# Patient Record
Sex: MALE | Race: WHITE | NOT HISPANIC OR LATINO | URBAN - METROPOLITAN AREA
[De-identification: names, ages, dates, MRNs, and addresses within clinical notes are randomized per-mention and may not be internally consistent; named-entity substitution may affect disease eponyms.]

---

## 2017-01-09 ENCOUNTER — EMERGENCY (EMERGENCY)
Facility: HOSPITAL | Age: 37
LOS: 1 days | Discharge: HOME | End: 2017-01-09
Attending: EMERGENCY MEDICINE | Admitting: EMERGENCY MEDICINE
Payer: SELF-PAY

## 2017-01-09 VITALS
DIASTOLIC BLOOD PRESSURE: 50 MMHG | OXYGEN SATURATION: 98 % | HEART RATE: 72 BPM | RESPIRATION RATE: 20 BRPM | TEMPERATURE: 99 F | SYSTOLIC BLOOD PRESSURE: 102 MMHG

## 2017-01-09 PROCEDURE — 99283 EMERGENCY DEPT VISIT LOW MDM: CPT

## 2017-01-09 RX ORDER — DIPHENHYDRAMINE HCL 50 MG
25 CAPSULE ORAL ONCE
Qty: 0 | Refills: 0 | Status: COMPLETED | OUTPATIENT
Start: 2017-01-09 | End: 2017-01-09

## 2017-01-09 RX ORDER — ONDANSETRON 8 MG/1
4 TABLET, FILM COATED ORAL ONCE
Qty: 0 | Refills: 0 | Status: COMPLETED | OUTPATIENT
Start: 2017-01-09 | End: 2017-01-09

## 2017-01-09 RX ADMIN — Medication 25 MILLIGRAM(S): at 22:52

## 2017-01-09 RX ADMIN — ONDANSETRON 4 MILLIGRAM(S): 8 TABLET, FILM COATED ORAL at 22:52

## 2017-01-09 NOTE — ED ADULT TRIAGE NOTE - CHIEF COMPLAINT QUOTE
pt biba, states he is shaky and he thinks he is withdrawing from heroin. started using 2 weeks ago and has not used for the last 2 days. also states he has bed bugs and bites.

## 2017-01-09 NOTE — ED PROVIDER NOTE - MEDICAL DECISION MAKING DETAILS
Pt given zofran 4mg PO for nausea and benadryl 25mg PO for pruritic insect bites. Education on bed bug erradication. A&Ox3. NAD. Afebrile. nontoxic. No other acute medical complaints at this time.

## 2017-01-13 DIAGNOSIS — S40.861A INSECT BITE (NONVENOMOUS) OF RIGHT UPPER ARM, INITIAL ENCOUNTER: ICD-10-CM

## 2017-01-13 DIAGNOSIS — F11.10 OPIOID ABUSE, UNCOMPLICATED: ICD-10-CM

## 2017-01-13 DIAGNOSIS — S40.862A INSECT BITE (NONVENOMOUS) OF LEFT UPPER ARM, INITIAL ENCOUNTER: ICD-10-CM

## 2017-01-13 DIAGNOSIS — F17.210 NICOTINE DEPENDENCE, CIGARETTES, UNCOMPLICATED: ICD-10-CM

## 2017-03-24 ENCOUNTER — EMERGENCY (EMERGENCY)
Facility: HOSPITAL | Age: 37
LOS: 1 days | Discharge: LEFT BEFORE DISCHARGE | End: 2017-03-24
Attending: EMERGENCY MEDICINE | Admitting: EMERGENCY MEDICINE
Payer: MEDICAID

## 2017-03-24 VITALS
OXYGEN SATURATION: 100 % | HEART RATE: 80 BPM | RESPIRATION RATE: 18 BRPM | WEIGHT: 169.98 LBS | SYSTOLIC BLOOD PRESSURE: 105 MMHG | HEIGHT: 70 IN | TEMPERATURE: 98 F | DIASTOLIC BLOOD PRESSURE: 72 MMHG

## 2017-03-24 DIAGNOSIS — M54.5 LOW BACK PAIN: ICD-10-CM

## 2017-03-24 DIAGNOSIS — M25.552 PAIN IN LEFT HIP: ICD-10-CM

## 2017-03-24 PROCEDURE — 99282 EMERGENCY DEPT VISIT SF MDM: CPT

## 2017-03-24 RX ORDER — HALOPERIDOL DECANOATE 100 MG/ML
5 INJECTION INTRAMUSCULAR ONCE
Qty: 0 | Refills: 0 | Status: DISCONTINUED | OUTPATIENT
Start: 2017-03-24 | End: 2017-03-28

## 2017-03-24 RX ORDER — BENZTROPINE MESYLATE 1 MG
1 TABLET ORAL ONCE
Qty: 0 | Refills: 0 | Status: DISCONTINUED | OUTPATIENT
Start: 2017-03-24 | End: 2017-03-28

## 2017-03-24 RX ORDER — DIVALPROEX SODIUM 500 MG/1
500 TABLET, DELAYED RELEASE ORAL ONCE
Qty: 0 | Refills: 0 | Status: DISCONTINUED | OUTPATIENT
Start: 2017-03-24 | End: 2017-03-28

## 2017-03-24 RX ORDER — IBUPROFEN 200 MG
600 TABLET ORAL ONCE
Qty: 0 | Refills: 0 | Status: DISCONTINUED | OUTPATIENT
Start: 2017-03-24 | End: 2017-03-28

## 2017-03-24 NOTE — ED PROVIDER NOTE - CONSTITUTIONAL, MLM
normal... unkmept, Well appearing, well nourished, awake, alert, oriented to person, place, time/situation and in no apparent distress.

## 2017-03-24 NOTE — ED PROVIDER NOTE - MEDICAL DECISION MAKING DETAILS
35 y/o M, apparently undomicilied presents to ED c/o L hip and lumbar back pain [place]  Pt well appearing.  No ecchymosis.  Pt also requesting dose of his psych medications recently prescribed.  Xray of hip and lumbar back ordered.  Pt eloped without receiving medications and before xray.  He was fed and tolerated PO.

## 2017-03-24 NOTE — ED PROVIDER NOTE - OBJECTIVE STATEMENT
35 y/o M presents to ED c/o lumbar back pain and L hip pain s/p [place]  Pt states the injury occurred a few hours ago and denies hitting his head.  He is not sure why he fell and states he may have been intoxicated at the time.  He also states he was recently discharged from outside hospital for psych and was discharged with prescriptions but has not been able fill them as of yet and is requesting a dose of each.  He has the paper prescriptions with him.  He denies any other injuries.

## 2017-03-25 PROBLEM — F19.90 OTHER PSYCHOACTIVE SUBSTANCE USE, UNSPECIFIED, UNCOMPLICATED: Chronic | Status: ACTIVE | Noted: 2017-01-09

## 2017-03-28 ENCOUNTER — EMERGENCY (EMERGENCY)
Facility: HOSPITAL | Age: 37
LOS: 1 days | Discharge: HOME | End: 2017-03-28
Attending: EMERGENCY MEDICINE | Admitting: EMERGENCY MEDICINE
Payer: MEDICAID

## 2017-03-28 VITALS
OXYGEN SATURATION: 100 % | TEMPERATURE: 98 F | SYSTOLIC BLOOD PRESSURE: 134 MMHG | DIASTOLIC BLOOD PRESSURE: 62 MMHG | HEART RATE: 76 BPM | RESPIRATION RATE: 20 BRPM

## 2017-03-28 DIAGNOSIS — F20.9 SCHIZOPHRENIA, UNSPECIFIED: ICD-10-CM

## 2017-03-28 DIAGNOSIS — F17.200 NICOTINE DEPENDENCE, UNSPECIFIED, UNCOMPLICATED: ICD-10-CM

## 2017-03-28 PROCEDURE — 99282 EMERGENCY DEPT VISIT SF MDM: CPT

## 2017-03-28 NOTE — ED PROVIDER NOTE - MEDICAL DECISION MAKING DETAILS
Patient presenting with hunger and not liking his shelter. he was just released from psych at  and also wants his other meds- thinks it might be Zoloft. Also wants to be back inpt because he likes it better than shelter. No acute psych sx or indication for psych eval. Advised to return to  to determine where his Rx's were sent and will give food. was tx's Haldol Dec when inpt so he is not overdue for his antipsychotic. Dn take cogentin.

## 2017-03-28 NOTE — ED PROVIDER NOTE - NS ED MD SCRIBE ATTENDING SCRIBE SECTIONS
HIV/DISPOSITION/RESULTS/PAST MEDICAL/SURGICAL/SOCIAL HISTORY/REVIEW OF SYSTEMS/HISTORY OF PRESENT ILLNESS/VITAL SIGNS( Pullset)/PHYSICAL EXAM/PROGRESS NOTE

## 2017-03-28 NOTE — ED ADULT TRIAGE NOTE - CHIEF COMPLAINT QUOTE
Pt states, "I don't like my shelter, please send me by ambulance to [facility] I like it better there" seen in this ED 4 days ago for nonmedical complaint. Asked pt if he would like shelter referral list or to speak with SW, pt states that during last visit he was told by another patient that if he complained of SI he would be given a bed. States, "So I will kill myself"

## 2017-03-28 NOTE — ED PROVIDER NOTE - OBJECTIVE STATEMENT
36 y.o male with a history of Schizophrenia presents to the ED for multiple reasons: not satisfied with his shelter and wants to be readmitted to the Psych department at . Pt was discharged from [facility] 4 days ago with medication but doesn't remember what medications, thinks it may be Zoloft. States he takes Haldol and received a Cogentin shot while he was at [facility]. States he is afraid of people "killing" him but denies any SI/HI at the moment. Admits to K2 use, last use was this morning. Requesting for food. Denies auditory hallucinations, visual hallucinations, and any psychotic episodes.

## 2017-10-19 NOTE — ED PROVIDER NOTE - OBJECTIVE STATEMENT
Kelly Duke 
 
 
 [address] [facility] 
[phone] Patient: Loretta Valdivia MRN: [id number] Sisto Genre You are allergic to the following No active allergies Recent Documentation Height Weight BMI Smoking Status 1.905 m 93.9 kg 25.87 kg/m2 Former Smoker Emergency Contacts Name Discharge Info Relation Home Work Mobile Sugar Veliz DISCHARGE CAREGIVER [3] Spouse [3] [phone] About your hospitalization You were admitted on:  October 19, 2017 You last received care in the:  [facility] PACU You were discharged on:  October 19, 2017 Unit phone number:  [phone] Why you were hospitalized Your primary diagnosis was:  Chronic Sinusitis Providers Seen During Your Hospitalizations Provider Role Specialty Primary office phone Radha Browning MD Attending Provider Otolaryngology [phone] Your Primary Care Physician (PCP) Primary Care Physician Office Phone Office Fax Lisa Niels [phone] Follow-up Information Follow up With Details Comments Contact Info Radha Browning MD On 10/23/2017 4:30 pm [address] [address] [address] [facility] 
[phone] Toby Thompson MD   [facility] [address] [facility] 
[phone] Current Discharge Medication List  
  
START taking these medications Dose & Instructions Dispensing Information Comments Morning Noon Evening Bedtime  
 cefUROXime 250 mg tablet Commonly known as:  CEFTIN Your last dose was: Your next dose is:    
   
   
 Dose:  250 mg Take 1 Tab by mouth two (2) times a day for 5 days. Quantity:  10 Tab Refills:  0 HYDROcodone-acetaminophen 5-325 mg per tablet Commonly known as:  Ksenia Mura Your last dose was: Your next dose is: Dose:  1-2 Tab Take 1-2 Tabs by mouth every four (4) hours as needed for Pain. Max Daily Amount: 12 Tabs. Quantity:  20 Tab Refills:  0 CONTINUE these medications which have NOT CHANGED Dose & Instructions Dispensing Information Comments Morning Noon Evening Bedtime  
 multivitamin tablet Commonly known as:  ONE A DAY Your last dose was: Your next dose is:    
   
   
 Dose:  1 Tab Take 1 Tab by mouth daily. Refills:  0 OMEGA 3 FISH OIL PO Your last dose was: Your next dose is:    
   
   
 Dose:  1 Tab Take 1 Tab by mouth. Refills:  0 XYZAL 5 mg tablet Generic drug:  levocetirizine Your last dose was: Your next dose is:    
   
   
 Dose:  5 mg Take 5 mg by mouth daily. Refills:  0 Where to Get Your Medications Information on where to get these meds will be given to you by the nurse or doctor. ! Ask your nurse or doctor about these medications  
  cefUROXime 250 mg tablet HYDROcodone-acetaminophen 5-325 mg per tablet Discharge Instructions DISCHARGE SUMMARY from your Nurse PATIENT INSTRUCTIONS After general anesthesia or intravenous sedation, for 24 hours or while taking prescription Narcotics: · Limit your activities · Do not drive and operate hazardous machinery · Do not make important personal or business decisions · Do  not drink alcoholic beverages · If you have not urinated within 8 hours after discharge, please contact your surgeon on call. Report the following to your surgeon: 
· Excessive pain, swelling, redness or odor of or around the surgical area · Temperature over 100.5 · Nausea and vomiting lasting longer than 4 hours or if unable to take medications · Any signs of decreased circulation or nerve impairment to extremity: change in color, persistent  numbness, tingling, coldness or increase pain · Any questions [address] Breathing deeply and coughing are very important exercises to do after surgery. Deep breathing and coughing open the little air tubes and air sacks in your lungs. You take deep breaths every day. You may not even notice - it is just something you do when you sigh or yawn. It is a natural exercise you do to keep these air passages open. After surgery, take deep breaths and cough, on purpose. DIRECTIONS: 
· Take 10 to 15 slow deep breaths every hour while awake. · Breathe in deeply, and hold it for 2 seconds. · Exhale slowly through puckered lips, like blowing up a balloon. · After every 4th or 5th deep breath, hug your pillow to your chest or belly and give a hard, deep cough. Yes, it will probably hurt. But doing this exercise is a very important part of healing after surgery. Take your pain medicine to help you do this exercise without too much pain. Coughing and deep breathing help prevent bronchitis and pneumonia after surgery. If you had chest or belly surgery, use a pillow as a \"hug balbir\" and hold it tightly to your chest or belly when you cough. ANKLE PUMPS Ankle pumps increase the circulation of oxygenated blood to your lower extremities and decrease your risk for circulation problems such as blood clots. They also stretch the muscles, tendons and ligaments in your foot and ankle, and prevent joint contracture in the ankle and foot, especially after surgeries on the legs. It is important to do ankle pump exercises regularly after surgery because immobility increases your risk for developing a blood clot. Your doctor may also have you take an Aspirin for the next few days as well. If your doctor did not ask you to take an Aspirin, consult with him before starting Aspirin therapy on your own. The exercise is quite simple. · Slowly point your foot forward, feeling the muscles on the top of your lower leg stretch, and hold this position for 5 seconds. · Next, pull your foot back toward you as far as possible, stretching the calf muscles, and hold that position for 5 seconds. · Repeat with the other foot. · Perform 10 repetitions every hour while awake for both ankles if possible (down and then up with the foot once is one repetition). You should feel gentle stretching of the muscles in your lower leg when doing this exercise. If you feel pain, or your range of motion is limited, don't push too hard. Only go the limit your joint and muscles will let you go. If you have increasing pain, progressively worsening leg warmth or swelling, STOP the exercise and call your doctor. MEDICATION AND  
SIDE EFFECT GUIDE The [address] [place] [address] EFFECT GUIDE was provided to the PATIENT AND CARE PROVIDER. Information provided includes instruction about drug purpose and common side effects for the following medications:  
·  [address] These are general instructions for a healthy lifestyle: *   Please give a list of your current medications to your Primary Care Provider. *   Please update this list whenever your medications are discontinued, doses are changed, or new medications (including over-the-counter products) are added. *   Please carry medication information at all times in case of emergency situations. About Smoking No smoking / No tobacco products Avoid exposure to second hand smoke Surgeon General's Warning:  Quitting smoking now greatly reduces serious risk to your health. Obesity, smoking, and sedentary lifestyle greatly increases your risk for illness and disease. A healthy diet, regular physical exercise & weight monitoring are important for maintaining a healthy lifestyle. Congestive Heart Failure You may be retaining fluid if you have a history of heart failure or if you experience any of the following symptoms:  Weight gain of 3 pounds or more overnight or 5 pounds in a week, increased swelling in your hands or feet or shortness of breath while lying flat in bed. Please call your doctor as soon as you notice any of these symptoms; do not wait until your next office visit. Recognize signs and symptoms of STROKE: 
F -  Face looks uneven A -  Arms unable to move or move evenly S -  Speech slurred or non-existent T -  Time-call 911 as soon as signs and symptoms begin-DO NOT go  
       back to bed or wait to see if you get better-TIME IS BRAIN. Warning Signs of HEART ATTACK Call 911 if you have these symptoms: 
 
? Chest discomfort. Most heart attacks involve discomfort in the center of the chest that lasts more than a few minutes, or that goes away and comes back. It can feel like uncomfortable pressure, squeezing, fullness, or pain. ? Discomfort in other areas of the upper body. Symptoms can include pain or discomfort in one or both arms, the back, neck, jaw, or stomach. ? Shortness of breath with or without chest discomfort. ? Other signs may include breaking out in a cold sweat, nausea, or lightheadedness. Don't wait more than five minutes to call [address]! Fast action can save your life. Calling 911 is almost always the fastest way to get lifesaving treatment. Emergency Medical Services staff can begin treatment when they arrive  up to an hour sooner than if someone gets to the hospital by car. The discharge information has been reviewed with the patient and spouse. Any questions and concerns from the patient and spouse have been addressed. The patient and spouse verbalized understanding. Other information in your discharge envelope: PRESCRIPTIONS      PHYSICAL THERAPY PRESCRIPTION 
     APPOINTMENT CARDS 
 [facility] Pamphlet for block or block with On-Q Catheter from   Anesthesia Service Medical device information sheets/pamphlets from their  School/work excuse note. /parent work excuse note. The following personal items collected during your admission are returned to you:  
Dental Appliance: Dental Appliances: None Vision: Visual Aid: Glasses Hearing Aid:   
Jewelry: Jewelry: None Clothing: Clothing: Pants, Footwear, Undergarments, Shirt Other Valuables: Other Valuables: Irvin Ludwig Valuables sent to safe: Personal Items Sent to Safe: n/aPatient Discharge Instructions Martin Jerez / [id number] : 1964 Admitted 10/19/2017 Discharged: 10/19/2017 Postoperative Instructions for Sinus Surgery The purpose of Functional Endoscopic Sinus Surgery (FESS) is to enlarge the natural sinus drainage pathways to prevent obstruction and infection. The surgery is performed through the nostrils using endoscopes and special instruments. Removal of bone and tissue blocking the sinus drainage pathways leaves, by nature, raw surfaces in the nose that must heal by lining tissue, or mucosa, growing back to re-line the newly created space. The #1 impediment to healing (other than smoking) is dryness. When blood clots and mucous dry on the surface of the operative sites, hard crusts can form that prevent the mucosa from healing over the raw areas. Crusts also act as scaffolding for scar bands to grow across opened drainage pathways that may affect the success of the surgery. These scar bands sometimes require further surgery to correct. There are a few things that can be done to prevent crusting. At the time of surgery, new viscous gel type material can be placed over the raw surfaces to keep the areas moist for a few days. This material dissolves slowly over the first few days after surgery.   After surgery, saline irrigation of the nose is extremely important to prevent dryness. Your participation in the postoperative care can sometimes make or break the success of the surgery. SWELLING Every operation, no matter how minor, is accompanied by swelling of the surrounding tissues. The degree of swelling varies from person to person, and with the amount of surgery required. There is usually not very much, if any, external swelling after endoscopic sinus surgery. However, if other procedures, such as septoplasty or rhinoplasty were performed along with the sinus suregery, swelling may be a bigger factor. Staying upright as much as possible is important after you leave the hospital.  Avoid bending over or lifting heavy objects for at least one week as it may aggravate swelling or cause bleeding. Sleep with the head of the bed elevated for approximately 3-5 days. You may place an additional pillow under your head to accomplish this. Avoid rubbing the nostrils and base of the nose as this may cause infection or bleeding. Use the moustache gauze dressing if discharge is excessive. The moustache gauze dressing may have to be changed as often as every thirty minutes the day of surgery. This is not abnormal and will significantly lessen in the next twelve hour period. You will usually have clear plastic tape on your cheeks with the moustache dressing taped over it. Leave the clear tape in place until you do not need the moustache dressing anymore (usually 24-48 hours). This avoids irritating the skin from frequent moustache dressing changes. NASAL PACKING AND BLEEDING There is usually a small amount of absorbable packing material placed in the sinus regions at the time of the procedure. This packing is typically not noticeable or uncomfortable.   Any residual packing material that remains at the time of the first postoperative visit may be removed in the office. If you blow out some odd-looking material during your recovery period, it may represent some of the packing material.  No need for alarm. In rare cases, if bleeding was severe during the procedure, a more formal nasal pack may need to be placed and this will have to be removed a couple of days after surgery. PAIN Pain is generally not severe after sinus surgery. Some patients will experience sinus pressure and headaches. Occasional nasal 'burning' is reported. If the pain is mild, acetaminophen or ibuprofen (or Aleve) may be all that is needed. A narcotic pain medication prescription is often provided and this can be taken if necessary. Please do not take aspirin or any aspirin containing medication for at least one week after surgery. MEDICATIONS You will usually be prescribed pain medication, antibiotics, ointment, and nasal saline. Nasal saline can be obtained over the counter. Sealed, pressurized saline spray, such as Simply Saline or Nasamist, is preferred (see pictures). Any antibiotic ointment, such as bacitracin or polysporin, can be used. IRRIGATING THE NOSE On the first to second day after the surgery, you should begin using NeilMed Sinusrinse irrigation at least twice a day. This is extremely important, as explained above. It is not necessary to boil the water to be used for irrigation. Distilled water is fine. You may see blood clots, crusts, and even some fresh blood during irrigation. This is normal and should not be cause for alarm. RESUMING ACTIVITIES You should avoid heavy lifting, bending, and straining for the first 2 weeks after surgery. Your exercise regimen must be lessened to some extent for the first few weeks following surgery. Upper body exercise is especially prohibited, as it is more likely to cause bleeding. Walking is always permissible. PLEASE CHECK WITH THE OFFICE BFORE RESUMING ANY OTHER EXERCISE OR ATHLETIC ACTIVITY. SMOKING Smoking is absolutely prohibited after surgery. Smoking seriously impairs wound healing. Nicotine gums or other stop-smoking products containing nicotine should not be used. Nicotine inhibits wound healing by constricting blood vessels. It is essential to quit smoking prior to surgery. YOUR FIRST POSTOPERATIVE OFFICE VISIT The appointment for your first postoperative visit can be made prior to your surgery or you can call to schedule after the procedure. At that visit, the nose will be closely inspected and any residual debris (ie., blood, scabbing/crusting, residual absorbable packing) will likely be removed using a small suction. This is generally fairly easy and you should not be too worried about much pain or recovery following the appointment. RETURN TO WORK OR SCHOOL The average patient is able to return to school or work three to five days following the surgery. Physical activity will be curtailed, as discussed above. LONG-TERM CARE Please realize that the sinuses may require six full weeks for complete healing. Often there will be crusting at the healing sites that will need to be removed by the doctor. This may require multiple  visits for the first six weeks. This is normal and should not be cause for alarm. The crusts may interfere with proper breathing and healing, so it is important to keep these visits. Continued use of saline irrigations will aid in reducing the amount of crusting. FOLLOW UP HAS BEEN ARRANGED FOR Monday AT 4:30 PM. If you have any questions, please call us at [phone]. We are always happy to answer your questions, and if you should have a problem, this number is answered 24 hours a day. Discharge Orders None Introducing [facility] SERVICES! [employer] introduces [employer] patient portal. Now you can access parts of your medical record, email your doctor's office, and request medication refills online. 1. In your internet browser, go to [email or web address] 2. Click on the First Time User? Click Here link in the Sign In box. You will see the New Member Sign Up page. 3. Enter your [employer] Access Code exactly as it appears below. You will not need to use this code after youve completed the sign-up process. If you do not sign up before the expiration date, you must request a new code. · [employer] Access Code: [email or web address] Expires: 12/21/2017 11:28 AM 
 
4. Enter the last four digits of your Social Security Number (xxxx) and Date of Birth (mm/dd/yyyy) as indicated and click Submit. You will be taken to the next sign-up page. 5. Create a [employer] ID. This will be your [employer] login ID and cannot be changed, so think of one that is secure and easy to remember. 6. Create a [employer] password. You can change your password at any time. 7. Enter your Password Reset Question and Answer. This can be used at a later time if you forget your password. 8. Enter your e-mail address. You will receive e-mail notification when new information is available in [address] 9. Click Sign Up. You can now view and download portions of your medical record. 10. Click the Download Summary menu link to download a portable copy of your medical information. If you have questions, please visit the Frequently Asked Questions section of the [employer] website. Remember, [employer] is NOT to be used for urgent needs. For medical emergencies, dial 911. Now available from your iPhone and Android! General Information Please provide this summary of care documentation to your next provider. Patient Signature:  ____________________________________________________________ Date:  ____________________________________________________________  
  
Scharlene Alosa Provider Signature:  ____________________________________________________________ Date:  ____________________________________________________________ 37 y/o M undomiciled M with PMH of Drug Abuse (Heroin) presents c/o "heroin withdrawal" x 2-3 days. He reports relapsing on heroin for the past 2 months and last used 1 week ago. Now c/o feeling "sick", nauseous and shaky over the past 2-3 days. He also reports having "bed bug bites" this week from sleeping on the streets. Pt is requesting a shower and a bed to sleep in.    Denies fever, chills, headache, dizziness, CP, SOB, palpitations, abdo pain, vomiting, diarrhea

## 2017-11-10 NOTE — ED ADULT TRIAGE NOTE - SPO2 (%)
100 Continue home Folic Acid.   Iron studies.  Consider PRBC transfusion.  Neutrophil count > 1,000 cells/uL  No neutropenic precautions at the moment. 1 PRBC transfusion.  Neutrophil count > 1,000 cells/uL  No neutropenic precautions at the moment. stat 1 unti PRBC transfusion. Pt consented for transfusion.   Neutrophil count > 1,000 cells/uL  No neutropenic precautions at this time.   F/u CBC in AM.  Iron panel, B12, folate in the AM.   Maintain Plt >20k

## 2020-07-28 NOTE — ED PROVIDER NOTE - PSYCHIATRIC, MLM
Patient eating meal. Alert and oriented to person, place, time/situation. Mood and affect consistent with chronic stable mental health- also a bit tired but is fully rousable, No AH. SI or AH or disorganization.

## 2021-04-25 ENCOUNTER — EMERGENCY (EMERGENCY)
Facility: HOSPITAL | Age: 41
LOS: 1 days | Discharge: HOME | End: 2021-04-25
Attending: EMERGENCY MEDICINE | Admitting: EMERGENCY MEDICINE
Payer: MEDICAID

## 2021-04-25 ENCOUNTER — EMERGENCY (EMERGENCY)
Facility: HOSPITAL | Age: 41
LOS: 1 days | Discharge: HOME | End: 2021-04-25
Admitting: EMERGENCY MEDICINE
Payer: MEDICAID

## 2021-04-25 VITALS
HEART RATE: 88 BPM | RESPIRATION RATE: 16 BRPM | OXYGEN SATURATION: 94 % | TEMPERATURE: 98 F | DIASTOLIC BLOOD PRESSURE: 75 MMHG | SYSTOLIC BLOOD PRESSURE: 128 MMHG | HEIGHT: 70 IN | WEIGHT: 259.93 LBS

## 2021-04-25 VITALS
RESPIRATION RATE: 16 BRPM | OXYGEN SATURATION: 95 % | SYSTOLIC BLOOD PRESSURE: 128 MMHG | WEIGHT: 253.97 LBS | DIASTOLIC BLOOD PRESSURE: 85 MMHG | HEIGHT: 74 IN | HEART RATE: 105 BPM | TEMPERATURE: 98 F

## 2021-04-25 DIAGNOSIS — F17.200 NICOTINE DEPENDENCE, UNSPECIFIED, UNCOMPLICATED: ICD-10-CM
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
## 2021-04-25 DIAGNOSIS — F19.90 OTHER PSYCHOACTIVE SUBSTANCE USE, UNSPECIFIED, UNCOMPLICATED: ICD-10-CM

## 2021-04-25 DIAGNOSIS — T40.7X4A POISONING BY CANNABIS (DERIVATIVES), UNDETERMINED, INITIAL ENCOUNTER: ICD-10-CM

## 2021-04-25 DIAGNOSIS — R41.82 ALTERED MENTAL STATUS, UNSPECIFIED: ICD-10-CM

## 2021-04-25 DIAGNOSIS — F10.129 ALCOHOL ABUSE WITH INTOXICATION, UNSPECIFIED: ICD-10-CM

## 2021-04-25 DIAGNOSIS — F99 MENTAL DISORDER, NOT OTHERWISE SPECIFIED: ICD-10-CM

## 2021-04-25 DIAGNOSIS — Z72.89 OTHER PROBLEMS RELATED TO LIFESTYLE: ICD-10-CM

## 2021-04-25 DIAGNOSIS — F20.9 SCHIZOPHRENIA, UNSPECIFIED: ICD-10-CM

## 2021-04-25 PROCEDURE — 99283 EMERGENCY DEPT VISIT LOW MDM: CPT

## 2021-04-25 PROCEDURE — 99284 EMERGENCY DEPT VISIT MOD MDM: CPT

## 2021-04-25 NOTE — ED ADULT TRIAGE NOTE - CHIEF COMPLAINT QUOTE
Pt brought in by EMS stating "I took heroin through a needle, K2 and drank alcohol." Denies any other medical complaints. A & O X 3, walking with steady gait.

## 2021-04-25 NOTE — ED ADULT NURSE NOTE - NSIMPLEMENTINTERV_GEN_ALL_ED
Implemented All Fall Risk Interventions:  [place] to call system. Call bell, personal items and telephone within reach. Instruct patient to call for assistance. Room bathroom lighting operational. Non-slip footwear when patient is off stretcher. Physically safe environment: no spills, clutter or unnecessary equipment. Stretcher in lowest position, wheels locked, appropriate side rails in place. Provide visual cue, wrist band, yellow gown, etc. Monitor gait and stability. Monitor for mental status changes and reorient to person, place, and time. Review medications for side effects contributing to fall risk. Reinforce activity limits and safety measures with patient and family.

## 2021-04-25 NOTE — ED PROVIDER NOTE - PHYSICAL EXAMINATION
Gen - WDWN, +AOB, no acute distress  Skin - warm, dry, intact  HEENT - AT/NC, PERRL, mild conjunctival injection, pupils 3mm b/l, TM intact with no hemotympanium b/l, no facial contusion or periorbital ecchymosis, o/p clear, uvula midline, airway patent, neck supple with no step off or midline tenderness, FROM   CV - S1S2, R/R/R  Resp - respiration non-labored, CTAB, symmetric bs b/l, no r/r/w  GI - NABS, soft, ND, NT, no rebound or guarding, no CVAT b/l  MS - w/w/p, no c/c/e, calves supple and NT  Neuro - Alert and awake, slightly slurred speech, ambulatory with steady gait, no focal deficits

## 2021-04-25 NOTE — ED PROVIDER NOTE - PATIENT PORTAL LINK FT
You can access the FollowMyHealth Patient Portal offered by [facility] by registering at the following website: [email or web address]. By joining [employer]’s FollowMyHealth portal, you will also be able to view your health information using other applications (apps) compatible with our system.

## 2021-04-25 NOTE — ED PROVIDER NOTE - PHYSICAL EXAMINATION
General: lethargic, arousable to touch, smells of alcohol  Head: NCAT  Eyes: PERRL  Heart: RRR  Lungs: CTAB  Abd: soft, NTND  Neuro: moves all 4 extremities equally Const: Severe intox, poor hygiene, unkempt  ENT: Airway patent, protecting airway. MMM. No scalp hematoma and no apparent c-spine tenderness.  Eyes: Clear bilaterally, pupils equal, round 3mm  Cardiac: Normal rate, regular rhythm.  Heart sounds S1, S2.  No murmurs, rubs or gallops.  Resp: Breath sounds clear and equal bilaterally.  GI: Abdomen soft, appears non-tender, no guarding.  MSK: No signs of acute trauma or injury.   Neuro: Severe intox, GALLAGHER, normal tone, slurred speech, answers a few simple questions.  Skin: No signs of acute trauma or injury.   Psych: Severe intox, mostly cooperative.

## 2021-04-25 NOTE — ED ADULT TRIAGE NOTE - CHIEF COMPLAINT QUOTE
[facility] for ETOH and k2 use. +admits to drinking beer. no obvious signs of injury/trauma noted.

## 2021-04-25 NOTE — ED PROVIDER NOTE - CARE PLAN
Principal Discharge DX:	Altered mental status, unspecified altered mental status type  Secondary Diagnosis:	Drug use

## 2021-04-25 NOTE — ED PROVIDER NOTE - OBJECTIVE STATEMENT
40y Male bibems for AMS, possibly 2/2 substance use, no trauma reported.  limited history 2/2 mental status. 40y Male bibems for AMS, he admits to K2 and beer. No trauma reported.  No vomiting. Answers a few questions.

## 2021-04-25 NOTE — ED PROVIDER NOTE - NSFOLLOWUPINSTRUCTIONS_ED_ALL_ED_FT
Substance Abuse    Chemical dependency is an addiction to drugs or alcohol. It is characterized by the repeated behavior of seeking out and using drugs and alcohol despite harmful consequences to the health and safety of oneself and others. Using drugs in a manner that brought you to an Emergency Room suggests you may have an drug abuse problem. Seek help at a drug addiction center.    SEEK IMMEDIATE MEDICAL CARE IF YOU HAVE ANY OF THE FOLLOWING SYMPTOMS: chest pain, shortness of breath, change in mental status, thoughts about hurting killing yourself, thoughts about hurting or killing somebody else, hallucinations, or worsening depression.    Alcohol Abuse    Alcohol intoxication occurs when the amount of alcohol that a person has consumed impairs his or her ability to mentally and physically function. Chronic alcohol consumption can also lead to a variety of health issues including neurological disease, stomach disease, heart disease, liver disease, etc. Do not drive after drinking alcohol. Drinking enough alcohol to end up in an Emergency Room suggests you may have an alcohol abuse problem. Seek help at a drug addiction center.    SEEK IMMEDIATE MEDICAL CARE IF YOU HAVE ANY OF THE FOLLOWING SYMPTOMS: seizures, vomiting blood, blood in your stool, lightheadedness/dizziness, or becoming shaky to tremulous when you stop drinking.

## 2021-04-25 NOTE — ED PROVIDER NOTE - CLINICAL SUMMARY MEDICAL DECISION MAKING FREE TEXT BOX
40y Male ams, possibly 2/2 substance, no trauma noted or reported, protecting airway, will monitor for safety and reassessment for mental status

## 2021-04-25 NOTE — ED PROVIDER NOTE - NS ED ROS FT
Unable to cooperate with remainder of history due to clinical condition/AMS Denies falls, trauma, injuries or nausea/vomiting or headache.

## 2021-04-25 NOTE — ED PROVIDER NOTE - PATIENT PORTAL LINK FT
You can access the FollowMyHealth Patient Portal offered by [facility] by registering at the following website: [email or web address]. By joining [employer]’s FollowMyHealth portal, you will also be able to view your health information using other applications (apps) compatible with our system.

## 2021-04-25 NOTE — ED PROVIDER NOTE - NSFOLLOWUPINSTRUCTIONS_ED_ALL_ED_FT
Alcohol and Drug Use Disorder    WHAT YOU NEED TO KNOW:      DISCHARGE INSTRUCTIONS:    Seek care immediately if:     Your heart is beating faster than usual.      You have hallucinations.      You cannot remember what happens while you are drinking.      You have seizures.    Contact your healthcare provider if:     You are anxious and have nausea.      Your hands are shaky and you are sweating heavily.      You have questions or concerns about your condition or care.    Follow up with your healthcare provider as directed: Do not try to stop drinking on your own. Your healthcare provider may need to help you withdraw from alcohol safely. He may need to admit you to the hospital. You may also need any of the following treatments:    Medicines to decrease your craving for alcohol      Support groups such as Alcoholics Anonymous       Therapy from a psychiatrist or psychologist       Admission to an inpatient facility for treatment for severe AUD    Interested in discussing options to reduce your alcohol or drug use?      [facility]: [phone]   [facility] Substance Abuse Services: [phone], option #2   Methadone Maintenance & Ambulatory Opiate Detox: [phone]  [facility]: [phone]  [facility]: [phone]  [facility]: [phone]    [facility]: [phone], option #2   [facility]: [phone]    [facility]: [phone]    [facility] Central Intake: [phone]  [facility] Chemical Dependency/Ancillary Withdrawal: [phone]  [facility] Methadone Maintenance: [phone]    [facility]: [phone]  [facility] Addiction Treatment Services: [phone]    [facility]: 0-757-4-[facility] Office of Alcoholism and Substance Abuse Services (OASAS): https://www.oasas.[place].gov/providerdirectory/  [facility] for Addiction Services and Psychotherapy Interventions Research (CASPIR)  www.caspirnyc.org     Interested in discussing options to reduce your tobacco use?    [facility] for Tobacco Control:  [phone]  [facility] QUITLINE: 9-088-ZZ-QUITS ([phone])    Interested in learning more about substance use?      http://rethinkingdrinking.niaaa.nih.gov   https://www.drugabuse.gov/patients-families     Learn more about opioid overdose prevention programs in [facility]:  http://www.health.[place].gov/diseases/aids/general/opioid_overdose_prevention/

## 2021-04-25 NOTE — ED ADULT NURSE NOTE - CAS DISCH ACCOMP BY
clinical exam, recommendations for assistance with meals and appropriate cuing, effects of pt's mental status on safety with PO intake, diet recommendations, POC. Wife verbalizes understanding/agreement. Recommend initiation of dysphagia II diet and thin liquids, aspiration precautions, assistance with meals. SLP will follow. D/w RN.    ADMISSION DATE: 8/14/2020  ADMITTING DIAGNOSIS: has Weight loss, unintentional; History of colon polyps; and Pelvic mass on their problem list.  ONSET DATE: this admission    Recent Chest Xray/CT of Chest: see chart    Date of Eval: 8/15/2020  Evaluating Therapist: Napoleon Donahue    Current Diet level:  Current Diet : Regular  Current Liquid Diet : Thin      Primary Complaint  Patient Complaint: reports difficulty swallowing, \"choking\" episodes    Pain:  Pain Assessment  Pain Assessment: 0-10  Pain Level: 9  Patient's Stated Pain Goal: No pain  Pain Type: Acute pain  Pain Location: Leg  Pain Descriptors: Cramping, Spasm  Pain Frequency: Continuous    Reason for Referral  Marry Cohn was referred for a bedside swallow evaluation to assess the efficiency of his swallow function, identify signs and symptoms of aspiration and make recommendations regarding safe dietary consistencies, effective compensatory strategies, and safe eating environment. Impression  Dysphagia Diagnosis: Mild to moderate oral stage dysphagia;Mild pharyngeal stage dysphagia  Dysphagia Outcome Severity Scale: Level 5: Mild dysphagia- Distant supervision. May need one diet consistency restricted     Treatment Plan  Requires SLP Intervention: Yes  Duration/Frequency of Treatment: 2-3x/week for LOS          Recommended Diet and Intervention  Diet Solids Recommendation: Dysphagia Minced and Moist (Dysphagia II)  Liquid Consistency Recommendation: Thin  Recommended Form of Meds: PO     Therapeutic Interventions: Diet tolerance monitoring;Patient/Family education;Effortful swallow; Therapeutic PO trials with Self

## 2021-04-25 NOTE — ED PROVIDER NOTE - OBJECTIVE STATEMENT
39 yo M with PMHx of polysubstance abuse, BIBA for AMS.  Pt called ambulance himself due to smoking K2, alcohol and s/p IV heroin this morning.  Seen and evaluated here earlier today and cleared for dc.  Pt admits to repeat K2 use and alcohol tonight.  Denies trauma, fall, HA, dizziness, bleeding, N/V/D/C, CP, SOB, palpitations, tremors, change in urinary/bowel function, and abdominal pain.

## 2021-04-25 NOTE — ED PROVIDER NOTE - CLINICAL SUMMARY MEDICAL DECISION MAKING FREE TEXT BOX
pt here for public intox, refused FS, monitored in the ED with improved mental status, AFVSS, currently ambulatory with steady gait, tolerating PO without N/V, clear speech, without additional medical complaints noted. Counseling provided. Medically stable for d/c.

## 2021-05-10 ENCOUNTER — EMERGENCY (EMERGENCY)
Facility: HOSPITAL | Age: 41
LOS: 1 days | Discharge: HOME | End: 2021-05-10
Attending: EMERGENCY MEDICINE | Admitting: EMERGENCY MEDICINE
Payer: MEDICAID

## 2021-05-10 VITALS
TEMPERATURE: 98 F | SYSTOLIC BLOOD PRESSURE: 124 MMHG | HEIGHT: 70 IN | OXYGEN SATURATION: 99 % | RESPIRATION RATE: 18 BRPM | DIASTOLIC BLOOD PRESSURE: 77 MMHG | WEIGHT: 160.06 LBS | HEART RATE: 84 BPM

## 2021-05-10 VITALS
SYSTOLIC BLOOD PRESSURE: 119 MMHG | HEIGHT: 70 IN | HEART RATE: 68 BPM | WEIGHT: 160.06 LBS | DIASTOLIC BLOOD PRESSURE: 71 MMHG | OXYGEN SATURATION: 96 % | TEMPERATURE: 98 F | RESPIRATION RATE: 18 BRPM

## 2021-05-10 VITALS
OXYGEN SATURATION: 95 % | TEMPERATURE: 98 F | HEART RATE: 62 BPM | SYSTOLIC BLOOD PRESSURE: 115 MMHG | DIASTOLIC BLOOD PRESSURE: 68 MMHG | RESPIRATION RATE: 17 BRPM

## 2021-05-10 DIAGNOSIS — F99 MENTAL DISORDER, NOT OTHERWISE SPECIFIED: ICD-10-CM

## 2021-05-10 DIAGNOSIS — F20.9 SCHIZOPHRENIA, UNSPECIFIED: ICD-10-CM

## 2021-05-10 DIAGNOSIS — R41.82 ALTERED MENTAL STATUS, UNSPECIFIED: ICD-10-CM

## 2021-05-10 DIAGNOSIS — F19.929 OTHER PSYCHOACTIVE SUBSTANCE USE, UNSPECIFIED WITH INTOXICATION, UNSPECIFIED: ICD-10-CM

## 2021-05-10 DIAGNOSIS — F19.10 OTHER PSYCHOACTIVE SUBSTANCE ABUSE, UNCOMPLICATED: ICD-10-CM

## 2021-05-10 PROCEDURE — 99284 EMERGENCY DEPT VISIT MOD MDM: CPT

## 2021-05-10 NOTE — ED PROVIDER NOTE - ATTENDING CONTRIBUTION TO CARE
Pt w hx of drug abuse, BIBEMS for intoxication, smoked k2 earlier. no signs of trauma. no other medical complains. old chart review shows visit for same. Will let sober up for discharge

## 2021-05-10 NOTE — ED PROVIDER NOTE - NSFOLLOWUPINSTRUCTIONS_ED_ALL_ED_FT
Cut down on K2 and alcohol use as it is bad for your health    Return to the ER if you have new chest pain, shortness of breath, fevers, inability to eat or drink, or thoughts of harming yourself.

## 2021-05-10 NOTE — ED PROVIDER NOTE - PATIENT PORTAL LINK FT
You can access the FollowMyHealth Patient Portal offered by [facility] by registering at the following website: [email or web address]. By joining [employer]’s FollowMyHealth portal, you will also be able to view your health information using other applications (apps) compatible with our system.

## 2021-05-10 NOTE — ED ADULT TRIAGE NOTE - CHIEF COMPLAINT QUOTE
PAtient to ED for AMS - recently discharged from ED for same complaint.  Called in by PD.  Patient arrives sleepy

## 2021-05-10 NOTE — ED PROVIDER NOTE - PROGRESS NOTE DETAILS
Pt tolerated PO intake and was able to ambulate around the department with steady gait. Requested discharge. At this time appeared clinically sober and has capacity to make own decisions. Will dc. He was offered, but did not want to wait for, rehab referral and discharge papers. He walked out before he was given papers. JAMMIE Hinton (Resident)

## 2021-05-10 NOTE — ED PROVIDER NOTE - OBJECTIVE STATEMENT
41 y/o male BIB EMS for AMS due to K2 use. No acute medical complaints or apparent trauma noted. Unable to cooperate with remainder of history due to clinical condition/AMS. 41 y/o male BIB EMS for AMS due to K2 use. No acute medical complaints or apparent trauma noted. He denies alcohol or other coingestants. Per EMS they brought him to [facility]'s ER yesterday for same symptoms and drug use. Unable to cooperate with remainder of history due to clinical condition/AMS.

## 2021-05-10 NOTE — ED PROVIDER NOTE - OBJECTIVE STATEMENT
40 male pt, BIBEMS for AMS, presents after K2 use. no signs of trauma, no medical complains. Asking for a blanket  and to be discharged.

## 2021-05-10 NOTE — ED PROVIDER NOTE - PATIENT PORTAL LINK FT
You can access the FollowMyHealth Patient Portal offered by [facility] by registering at the following website: [email or web address]. By joining [employer]’s FollowMyHealth portal, you will also be able to view your health information using other applications (apps) compatible with our system.

## 2021-08-13 ENCOUNTER — EMERGENCY (EMERGENCY)
Facility: HOSPITAL | Age: 41
LOS: 1 days | Discharge: HOME | End: 2021-08-13
Attending: EMERGENCY MEDICINE | Admitting: EMERGENCY MEDICINE
Payer: MEDICAID

## 2021-08-13 VITALS
SYSTOLIC BLOOD PRESSURE: 108 MMHG | WEIGHT: 184.97 LBS | RESPIRATION RATE: 18 BRPM | TEMPERATURE: 98 F | DIASTOLIC BLOOD PRESSURE: 75 MMHG | HEART RATE: 90 BPM | HEIGHT: 70 IN | OXYGEN SATURATION: 97 %

## 2021-08-13 DIAGNOSIS — F11.10 OPIOID ABUSE, UNCOMPLICATED: ICD-10-CM

## 2021-08-13 DIAGNOSIS — F31.9 BIPOLAR DISORDER, UNSPECIFIED: ICD-10-CM

## 2021-08-13 DIAGNOSIS — F17.200 NICOTINE DEPENDENCE, UNSPECIFIED, UNCOMPLICATED: ICD-10-CM

## 2021-08-13 DIAGNOSIS — F22 DELUSIONAL DISORDERS: ICD-10-CM

## 2021-08-13 PROCEDURE — 93005 ELECTROCARDIOGRAM TRACING: CPT

## 2021-08-13 PROCEDURE — 99283 EMERGENCY DEPT VISIT LOW MDM: CPT

## 2021-08-13 PROCEDURE — 93010 ELECTROCARDIOGRAM REPORT: CPT

## 2021-08-13 PROCEDURE — 99284 EMERGENCY DEPT VISIT MOD MDM: CPT

## 2021-08-13 NOTE — ED PROVIDER NOTE - OBJECTIVE STATEMENT
40M reports PMH bipolar p/w ?SI.  Per triage note "Pt presents reporting he has bipolar, is off of his zoloft, depakote, and zyprexa. Pt made suicide attempt 2 weeks ago by snorting heroine. People sees people talking, hears voices but states they are not command hallucinations. Pt denies SI or HI today."  Prior charts indicate hx of ED visits for K2, etoh.   Per pt: 40M reports PMH bipolar p/w ?SI.  Per triage note "Pt presents reporting he has bipolar, is off of his zoloft, depakote, and zyprexa. Pt made suicide attempt 2 weeks ago by snorting heroine. People sees people talking, hears voices but states they are not command hallucinations. Pt denies SI or HI today."  Prior charts indicate hx of ED visits for K2, etoh.   Per pt: He has been feeling paranoid for ~1d. Also thinking about wanting to hurt himself by snorting more heroin - states that he tried hurting himself ~2w ago by snorting heroin as well. States that last cocaine/heroin/etoh was ~2w ago. Last smoked cigarettes in ED bathroom at [facility] today.   Denies HA, SOB, CP, rhinorrhea, cough, sore throat, vision changes, focal weakness/numbness, abd pain, urinary complaints, f/c. Denies HI.

## 2021-08-13 NOTE — ED PROVIDER NOTE - PROGRESS NOTE DETAILS
Riccardo: pt now wants to leave ED. Adamantly denying any SI/HI. Even on arrival pt was denying SI/HI, only describing that he wants to take heroin. Refusing to stay for further ED eval. Calm and cooperative, has clinical capacity. Clinically no indication to keep pt further in ED against his will. PT walked out prior to receiving dc instructions.

## 2021-08-13 NOTE — ED PROVIDER NOTE - CLINICAL SUMMARY MEDICAL DECISION MAKING FREE TEXT BOX
40M PMH bipolar (non-adherent to meds), polysubstance abuse p/w feeling paranoid and wanting to hurt himself by snorting heroin. No other systemic symptoms. Vitals wnl, exam as above.  ddx: Likely combo substance abuse and underlying psych illness. Clinically no specific toxidrome or withdrawal syndrome.   Labs, reassess.

## 2021-08-13 NOTE — ED PROVIDER NOTE - NSFOLLOWUPINSTRUCTIONS_ED_ALL_ED_FT
Alcohol/drugs are harmful to your health.  Stay well hydrated.  Return for fevers, persistent vomit, uncontrolled pain, worsening breathing, worsening lightheaded, worsening shaking, worsening confusion.  Follow up with primary doctor within 1-2 days.   Follow up with your psychiatrist within 1-2 days.     What do I need to know about suicide prevention? You may see suicide as the only way to escape emotional or physical pain and suffering. Help is available from people who care about you, and from professionals trained in suicide prevention. Prevention includes everything you and others can do to stop you from taking your life.     What should I do if I am considering suicide?     Contact a suicide prevention organization. The following are always available to help you:   National Suicide Prevention Lifeline: 1-555.781.1175 (3-601-045-TALK)  Suicide Hotline: 1-358.971.7505 (1-892-YTAUJIH)  For a list of international numbers: https://save.org/find-help/international-resources/  Contact your therapist. Your doctor can give you a list of therapists if you do not have one.  Keep medicines, weapons, and alcohol out of your home.  Do not spend time alone if you have thoughts of ending your life.    What increases my risk for suicide?   Depression or mental illness such as schizophrenia or bipolar disorder  Someone close to you attempted or committed suicide, or you attempted suicide  The death of a person who was important to you, or the anniversary of a death  Relationship stress from a breakup or loss of a friendship, or divorce  Mental, physical, or sexual abuse    What are the warning signs of suicide? The following can help you and others recognize that you are struggling:   Talking about your plan for committing suicide, or wanting to read or write about death or suicide  Cutting yourself, burning your skin with cigarettes, or driving recklessly  Drug or alcohol use, not taking your prescribed medicine, or taking too much  Not wanting to spend time with others or doing things you enjoy, feeling bored, or not wanting anyone to praise you  Changes in your appetite, sleep habits, energy levels, or weight  Feeling angry, or lashing out at others  A need to give away or throw away your belongings  Often skipping work  Suddenly not taking medicine for a mental illness without talking to your healthcare provider  Suddenly not going to therapy    How are suicidal thoughts treated?   Medicines may be given to prevent mood swings, or to decrease anxiety or depression. You will need to take all medicines as directed. A sudden stop can be harmful. It may take 4 to 6 weeks for the medicine to help you feel better.    Suicide risk assessment means healthcare providers will ask questions about your suicide thoughts and plans. They will ask how often you think about suicide and if you have tried it before. They will ask if you have begun to hurt yourself, such as with cutting or reckless driving. They may ask if you have access to weapons or drugs.    A safety plan includes a list of people or groups to contact if you have suicidal feelings again. The list may include friends, family members, a spiritual leader, and others you trust. You may be asked to make a verbal agreement or sign a contract that you will not try to harm yourself.    A therapist can help you identify and change negative feelings or beliefs about yourself. This may help change the way you feel and act. A therapist can also help you find ways to cope with things that cannot be changed.    What can I do to manage depression?   Get help for drug or alcohol abuse. Drugs and alcohol can make suicidal feelings worse and make you more likely to act on them. Drugs and alcohol can also cause or increase depression.  Talk to someone you trust. Be honest about your thoughts and feelings about suicide. You can call a suicide prevention center if you do not want to talk to someone you know.  Exercise as directed. Exercise can lift your mood, give you more energy, and make it easier to sleep.  Eat a variety of healthy foods. Healthy foods include fruits, vegetables, whole-grain breads, lean meats, fish, low-fat dairy products, and beans. Try to eat regularly even if you do not feel hungry. Depression can increase from a lack of nutrition or if you are hungry for long periods of time.  Create a sleep routine. Try to go to bed and wake up at the same time every day. Let your healthcare provider know if you are having trouble sleeping.  Take your medicine and go to therapy as directed. Medicine and therapy can help you manage your mental health. Do not stop taking your medicine without talking to your healthcare provider. If you do not like the way a medicine makes you feel, you may be able to try a different medicine.    Where can I find support and more information?     National Suicide Prevention Lifeline  [facility]  Phone: 2-185-343-IGEE (7390)  Web Address: http://www.suicidepreventionlifeline.org      Suicide Awareness Voices of Education  [address]  [place], [place]  Phone: 1-205.515.9001  Web Address: http://www.save.org    Call your local emergency number (911 in the [place]), or ask someone to call if:   You do something on purpose to hurt yourself.  You make a plan to commit suicide.  When should I or someone close to me call my doctor or therapist?   You act out in anger, are reckless, or are abusing alcohol or drugs.  You have serious thoughts of suicide, even with treatment.  You have more thoughts of suicide when you are alone.  You stop eating, or you begin to smoke cigarettes or drink alcohol.  You have questions or concerns about your condition or care.    CARE AGREEMENT:  You have the right to help plan your care. Learn about your health condition and how it may be treated. Discuss treatment options with your healthcare providers to decide what care you want to receive. You always have the right to refuse treatment.     Substance Use Disorder    Substance use disorder occurs when a person's repeated use of drugs or alcohol interferes with his or her ability to be productive. This disorder can cause problems with mental and physical health. It can affect your ability to have healthy relationships, and it can keep you from being able to meet your responsibilities at work, home, or school. It can also lead to addiction, which is a condition in which the person cannot stop using the substance consistently for a period of time.    The most commonly abused substances include:  Alcohol. Tobacco. Marijuana. Stimulants, such as cocaine and methamphetamine. Hallucinogens, such as LSD and PCP. Opioids, such as some prescription pain medicines and heroin.    What are the causes?  This condition may develop due to many complex social, psychological, or physical reasons, such as:  Stress. Abuse. Peer pressure. Anxiety or depression.     What increases the risk?  This condition is more likely to develop in people who:  Use substances to cope with stress. Have been abused. Have a mental health disorder, such as depression. Have a family history of substance use disorder.    What are the signs or symptoms?  Symptoms of this condition include:  Using the substance for longer periods of time or at a higher dosage than what is normal or intended. Having a lasting desire to use the substance. Being unable to slow down or stop your use of the substance. Spending an abnormal amount of time getting the substance, using the substance, or recovering from using the substance. Craving the substance. Using the substance in a way that interferes with work, school, social activities, and personal relationships. Using the substance even after having negative consequences, such as:  Health problems. Legal or financial troubles. Job loss. Broken relationships. Needing more and more of the substance to get the same effect (developing tolerance). Experiencing unpleasant symptoms if you do not use the substance (withdrawal). Using the substance to avoid withdrawal.    How is this diagnosed?  This condition may be diagnosed based on:  A physical exam. Your history of substance use. Your symptoms. This includes:  How substance use affects your life. Changes in personality, behaviors, and mood. Having at least two symptoms of substance use disorder within a 12-month period. Health issues related to substance use, such as liver damage, shortness of breath, fatigue, cough, or heart problems. Blood or urine tests to screen for alcohol and drugs.    How is this treated?  This condition may be treated by:  Stopping substance use safely. This may require taking medicines and being closely observed for several days. Taking part in group and individual counseling from mental health providers who help people with substance use disorder. Staying at a live-in (residential) treatment center for several days or weeks. Attending daily counseling sessions at a treatment center. Taking medicine as told by your health care provider:  To ease symptoms and prevent complications during withdrawal. To treat other mental health issues, such as depression or anxiety. To block cravings by causing the same effects as the substance. To block the effects of the substance or replace good sensations with unpleasant ones. Going to a support group to share your experience with others who are going through the same thing. Recovery can be a long process. Many people who undergo treatment start using the substance again after stopping (relapse). If you relapse, that does not mean that treatment will not work.    Follow these instructions at home:   Take over-the-counter and prescription medicines only as told by your health care provider. Do not use any drugs or alcohol. Attend support groups as needed. These groups, including 12-step programs like Alcoholics Anonymous and Narcotics Anonymous, are an important part of long-term recovery for many people. Keep all follow-up visits as told by your health care providers. This is important. This includes continuing to work with therapists and support groups.    Contact a health care provider if:  You cannot take your medicines as told. Your symptoms get worse. You have trouble resisting the urge to use drugs or alcohol. Get help right away if you:  Relapse. Think that you may have taken too much of a drug. The hotline of the National Poison Control Center is (293) 397-9295.Have signs of an overdose. Symptoms include:  Chest pain. Confusion. Sleepiness or difficulty staying awake. Slowed breathing. Nausea or vomiting. A seizure. Have serious thoughts about hurting yourself or someone else. Drug overdose is an emergency. Do not wait to see if the symptoms will go away. Get medical help right away. Call your local emergency services (807 in the [place] Do not drive yourself to the hospital.   If you ever feel like you may hurt yourself or others, or have thoughts about taking your own life, get help right away. You can go to your nearest emergency department or call:   Your local emergency services (675 in the [place] A suicide crisis helpline, such as the National Suicide Prevention Lifeline at 1-881.293.9636. This is open 24 hours a day.

## 2021-08-13 NOTE — ED ADULT NURSE NOTE - OBJECTIVE STATEMENT
pt is A&Ox4, denies any SI, homicide, "I don't know why I came." states the pt. pt denies any kind of pain at this moment. pt placed on Constant observation, NA at this bedside

## 2021-08-13 NOTE — ED ADULT NURSE NOTE - NS TRANSFER DISPOSITION PATIENT BELONGINGS
Nursing notes reviewed and accepted.    Chief Complaint   Patient presents with   • Well Child     rm 26. here with mom. no concerns.    • Imm/Inj     flu, kinrix, proquad.        Karlee Toledo is a 6 year old female who presents for 6 year old well child exam.  Patient presents with Mother.    Concerns raised today include: none    Elimination: Daily BM without constipation  Diet / Eating: picky eater at time, eats from all food groups, 3 meals with snacks between. Will try new foods.   Milk: 1% with meals, water or milk between.     SOCIAL:  Primary caretakers: Mom and Dad  School / :  at [employer]     DEVELOPMENT:  dresses with supervision, plays games with other children, says what to do when tired, cold, hungry, says first and last name when asked, counts to 10, walks up and down stairs, alternating feet, balances on each foot for 2 seconds, hops on 1 foot, copies a [place], copies a plus sign and draws a 3-part person    Birth history, medical history, surgical history, and family history reviewed and updated.    PHYSICAL EXAM:  Blood pressure 100/64, pulse 102, temperature 98.7 °F (37.1 °C), temperature source Temporal, resp. rate 20, height 3' 4.5\" (1.029 m), weight 17.6 kg.  GENERAL:  Well appearing  female, nontoxic, no acute distress.  Alert and interactive.  SKIN: Warm, normal turgor.  No cyanosis.  No bruises or lesions.  HEAD:  Normocephalic, atraumatic.    EYES:  Conjunctivae without injection or icterus.  PERRL (pupils equal, round, reactive to light), EOMI (extraocular movements intact).  NOSE: No flaring.  EARS:  TMs (tympanic membranes) transparent with good landmarks.  THROAT:  Oropharynx with moist mucous membranes and no lesions.  NECK:  Supple, no lymphadenopathy or masses.  HEART:  Regular rate and rhythm.  Quiet precordium.  Normal S1, S2.  No murmurs, rubs, gallops.   LUNGS:  Clear to auscultation bilaterally.  No wheezes, rales, rhonchi.  Normal work of  breathing.  ABDOMEN:  Soft, nontender.  No organomegaly or masses.  GENITOURINARY:  Normal female, Jeffrey I  EXTREMITIES:  Warm, dry, without abnormalities.  NEUROLOGIC:  Normal tone, bulk, strength.    ASSESSMENT:  6 year old female well child with normal growth and development  Stuttering-Resolved    PLAN:    All parental concerns and questions discussed.  Anticipatory guidance provided, handout given.              Safety/car/bicycle/fire/sharp objects/falls/water              Development              Discipline              Diet              Television              Analgesics/antipyretics              Sun exposure / insect repellent              Tobacco-free home              Dental care              Lead exposure risk: none                Immunizations: Kinrix, Proquad, Influenza.  Risks, benefits, and side effects discussed.  Return to clinic in 1 year for well child exam or sooner prn illness/concerns.       with patient

## 2021-08-13 NOTE — ED PROVIDER NOTE - PATIENT PORTAL LINK FT
You can access the FollowMyHealth Patient Portal offered by [facility] by registering at the following website: [email or web address]. By joining [employer]’s FollowMyHealth portal, you will also be able to view your health information using other applications (apps) compatible with our system.

## 2021-08-13 NOTE — ED ADULT TRIAGE NOTE - CHIEF COMPLAINT QUOTE
Pt presents reporting he has bipolar, is off of his zoloft, depakote, and zyprexa. Pt made suicide attempt 2 weeks ago by snorting heroine. People sees people talking, hears voices but states they are not command hallucinations. Pt denies SI or HI today. 1-1 initiated in triage.

## 2021-08-13 NOTE — ED PROVIDER NOTE - PHYSICAL EXAMINATION
R distal medial thigh: ~5cm black dry ulcer. Margins w/ minimal erythema, no warmth. PT states he got this ~2w ago when they put ice on him at [facility] to "wake me up."  No clonus, rigidity, tremors, fasciculations. PERRL, EOMI, no nystagmus. Strength 5/5. Steady unassisted gait. Normal bowel sounds, skin temp/color.  old hospital bands on wrist dated 8/7 and 8/11.

## 2021-09-23 ENCOUNTER — EMERGENCY (EMERGENCY)
Facility: HOSPITAL | Age: 41
LOS: 1 days | Discharge: HOME | End: 2021-09-23
Attending: EMERGENCY MEDICINE | Admitting: EMERGENCY MEDICINE
Payer: MEDICAID

## 2021-09-23 VITALS
DIASTOLIC BLOOD PRESSURE: 94 MMHG | SYSTOLIC BLOOD PRESSURE: 151 MMHG | HEIGHT: 70 IN | RESPIRATION RATE: 18 BRPM | HEART RATE: 110 BPM | TEMPERATURE: 99 F | OXYGEN SATURATION: 98 %

## 2021-09-23 DIAGNOSIS — M25.561 PAIN IN RIGHT KNEE: ICD-10-CM

## 2021-09-23 DIAGNOSIS — Z87.898 PERSONAL HISTORY OF OTHER SPECIFIED CONDITIONS: ICD-10-CM

## 2021-09-23 DIAGNOSIS — F10.129 ALCOHOL ABUSE WITH INTOXICATION, UNSPECIFIED: ICD-10-CM
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
## 2021-09-23 DIAGNOSIS — R41.82 ALTERED MENTAL STATUS, UNSPECIFIED: ICD-10-CM

## 2021-09-23 DIAGNOSIS — F20.9 SCHIZOPHRENIA, UNSPECIFIED: ICD-10-CM

## 2021-09-23 DIAGNOSIS — F09 UNSPECIFIED MENTAL DISORDER DUE TO KNOWN PHYSIOLOGICAL CONDITION: ICD-10-CM

## 2021-09-23 PROCEDURE — 99284 EMERGENCY DEPT VISIT MOD MDM: CPT

## 2021-09-23 NOTE — ED PROVIDER NOTE - NSFOLLOWUPINSTRUCTIONS_ED_ALL_ED_FT
Please see attached information on alcohol use.     Return to the ER for new or worsening knee pain, trouble walking, or any other concerning symptoms.     Follow up with your psychiatrist and PCP.

## 2021-09-23 NOTE — ED PROVIDER NOTE - OBJECTIVE STATEMENT
40M [place] No reported sustained falls or injuries or complaints. Patient states that he is a daily drinker - 40M brought in by EMS - intoxicated. Patient endorses drinking beer and smoking K2. +R knee pain but denies falls, injuries. No Si/Hi. HPI limited 2/2 to intox.

## 2021-09-23 NOTE — ED PROVIDER NOTE - PATIENT PORTAL LINK FT
You can access the FollowMyHealth Patient Portal offered by [facility] by registering at the following website: [email or web address]. By joining [employer]’s FollowMyHealth portal, you will also be able to view your health information using other applications (apps) compatible with our system.

## 2021-09-23 NOTE — ED ADULT NURSE NOTE - NSIMPLEMENTINTERV_GEN_ALL_ED
Implemented All Fall Risk Interventions:  [place] to call system. Call bell, personal items and telephone within reach. Instruct patient to call for assistance. Room bathroom lighting operational. Non-slip footwear when patient is off stretcher. Physically safe environment: no spills, clutter or unnecessary equipment. Stretcher in lowest position, wheels locked, appropriate side rails in place. Provide visual cue, wrist band, yellow gown, etc. Monitor gait and stability. Monitor for mental status changes and reorient to person, place, and time. Review medications for side effects contributing to fall risk. Reinforce activity limits and safety measures with patient and family.

## 2021-09-23 NOTE — ED PROVIDER NOTE - ATTENDING CONTRIBUTION TO CARE
patient with psych history, arrived initially altered, now with improved ms, ambulatory, with no acute medical or psychiatric complaints.

## 2021-09-23 NOTE — ED ADULT NURSE NOTE - OBJECTIVE STATEMENT
Pt drowsy, arousable to repeat stimuli, admits to alcohol and drug use. Speech slurred and mostly incoherent. Pt denies chest pain or SOB. Airway patent. No signs of traumas/injuries.

## 2021-09-23 NOTE — ED PROVIDER NOTE - PHYSICAL EXAMINATION
Const: Well-nourished, Well-developed, appearing stated age.  Eyes: no conjunctival injection, and symmetrical lids.  HEENT: Head NCAT, no lesions. Atraumatic external nose and ears.  Neck: Symmetric, trachea midline.   CVS: +S1/S2, Peripheral pulses 2+ and equal in all extremities.  RESP: Unlabored respiratory effort. Clear to auscultation bilaterally.  GI: Nontender/Nondistended, No CVA tenderness b/l.   MSK: Normocephalic/Atraumatic, R knee without any bony tenderness, edema, warmth. Full ROM.   Skin: Warm, dry and intact.   Neuro: CNs II-XII grossly intact. Motor & Sensation grossly intact.  Psych: Awake, Alert, & Oriented (AAO) x3. Appropriate mood and affect.

## 2021-09-23 NOTE — ED PROVIDER NOTE - CLINICAL SUMMARY MEDICAL DECISION MAKING FREE TEXT BOX
40M presenting acutely intoxicated. Endorsing K2 and etoh. +psychiatric history, no Si/Hi. Unremarkable physical exam. Will allow patient to metabolize and reassess. Nik Morris, EM PGY3

## 2021-09-24 VITALS
OXYGEN SATURATION: 98 % | HEART RATE: 104 BPM | RESPIRATION RATE: 18 BRPM | TEMPERATURE: 99 F | SYSTOLIC BLOOD PRESSURE: 135 MMHG | DIASTOLIC BLOOD PRESSURE: 83 MMHG

## 2021-09-24 NOTE — ED ADULT NURSE REASSESSMENT NOTE - NS ED NURSE REASSESS COMMENT FT1
Received Pt from previous RN lying on stretcher asleep, breathing without issue on RA and NAD. Awaiting dispo.

## 2021-10-11 ENCOUNTER — EMERGENCY (EMERGENCY)
Facility: HOSPITAL | Age: 41
LOS: 1 days | Discharge: HOME | End: 2021-10-11
Attending: EMERGENCY MEDICINE | Admitting: EMERGENCY MEDICINE
Payer: MEDICAID

## 2021-10-11 VITALS
SYSTOLIC BLOOD PRESSURE: 128 MMHG | HEART RATE: 100 BPM | WEIGHT: 175.05 LBS | DIASTOLIC BLOOD PRESSURE: 80 MMHG | OXYGEN SATURATION: 98 % | TEMPERATURE: 98 F | RESPIRATION RATE: 18 BRPM | HEIGHT: 70 IN

## 2021-10-11 DIAGNOSIS — F20.9 SCHIZOPHRENIA, UNSPECIFIED: ICD-10-CM

## 2021-10-11 DIAGNOSIS — F19.10 OTHER PSYCHOACTIVE SUBSTANCE ABUSE, UNCOMPLICATED: ICD-10-CM

## 2021-10-11 DIAGNOSIS — M25.552 PAIN IN LEFT HIP: ICD-10-CM

## 2021-10-11 PROCEDURE — 99284 EMERGENCY DEPT VISIT MOD MDM: CPT | Mod: 25

## 2021-10-11 PROCEDURE — 99283 EMERGENCY DEPT VISIT LOW MDM: CPT | Mod: 25

## 2021-10-11 PROCEDURE — 73502 X-RAY EXAM HIP UNI 2-3 VIEWS: CPT

## 2021-10-11 PROCEDURE — 73502 X-RAY EXAM HIP UNI 2-3 VIEWS: CPT | Mod: 26,LT

## 2021-10-11 RX ORDER — IBUPROFEN 200 MG
600 TABLET ORAL ONCE
Refills: 0 | Status: COMPLETED | OUTPATIENT
Start: 2021-10-11 | End: 2021-10-11

## 2021-10-11 RX ORDER — LIDOCAINE 4 G/100G
1 CREAM TOPICAL ONCE
Refills: 0 | Status: COMPLETED | OUTPATIENT
Start: 2021-10-11 | End: 2021-10-11

## 2021-10-11 RX ADMIN — LIDOCAINE 1 PATCH: 4 CREAM TOPICAL at 22:40

## 2021-10-11 RX ADMIN — Medication 600 MILLIGRAM(S): at 22:39

## 2021-10-11 NOTE — ED ADULT NURSE NOTE - NSIMPLEMENTINTERV_GEN_ALL_ED
Implemented All Fall Risk Interventions:  [place] to call system. Call bell, personal items and telephone within reach. Instruct patient to call for assistance. Room bathroom lighting operational. Non-slip footwear when patient is off stretcher. Physically safe environment: no spills, clutter or unnecessary equipment. Stretcher in lowest position, wheels locked, appropriate side rails in place. Provide visual cue, wrist band, yellow gown, etc. Monitor gait and stability. Monitor for mental status changes and reorient to person, place, and time. Review medications for side effects contributing to fall risk. Reinforce activity limits and safety measures with patient and family.

## 2021-10-11 NOTE — ED ADULT NURSE NOTE - OBJECTIVE STATEMENT
Presents for c/o L hip radiating to L knee pain s/p hip surg 7 weeks ago. Reports increased pain with ambulation with walker, able to ambulate to ED. Ezio pain meds today.     On assessment- aox4, asleep on stretcher when approached and easily roused to cooperation with exam, no apparent distress at rest, steady agit with ambulation with own walker observed to unit, no apparent swelling or redness to L hip at point of surgery noted, scar observed, sensation intact, pulses intact.

## 2021-10-12 NOTE — ED PROVIDER NOTE - PHYSICAL EXAMINATION
VITAL SIGNS: I have reviewed nursing notes and confirm.  CONSTITUTIONAL: Well-developed; in no acute distress.   SKIN:  warm and dry, no acute rash.   HEAD:  normocephalic, atraumatic.  EYES: PERRL, EOM intact; conjunctiva and sclera clear.  ENT: No nasal discharge; airway clear.   NECK: Supple; non tender.  CARD: S1, S2 normal; no murmurs, gallops, or rubs. Regular rate and rhythm.   RESP:  Clear to auscultation b/l, no wheezes, rales or rhonchi.  ABD: Normal bowel sounds; soft; non-distended; non-tender; no guarding/ rebound.  EXT: Normal ROM. He has mild ttp left lateral hip. No overlying skin changes. No clubbing, cyanosis or edema. 2+ pulses to b/l ue/le.  NEURO: Alert, oriented, grossly unremarkable. 5/5 strength in ble, sensation equal and intact.   PSYCH: Cooperative, mood and affect appropriate.

## 2021-10-12 NOTE — ED PROVIDER NOTE - CLINICAL SUMMARY MEDICAL DECISION MAKING FREE TEXT BOX
Pt is afebrile and hemodynamically stable. He has ttp left lateral hip without overlying skin changes. LLE is neurovascularly intact. Given ibuprofen and lidocaine patch in ED for pain. The patient has xrays that are negative for acute fracture or dislocation, hardware intact. Discussed results with pt. Encouraged follow up with orthopedist. Return precautions given.

## 2021-10-12 NOTE — ED PROVIDER NOTE - NSFOLLOWUPINSTRUCTIONS_ED_ALL_ED_FT
Please take ibuprofen 600mg up to three times daily for pain.    Use walker to ambulate.    Follow up with your orthopedist.    Return to the Emergency Department if you develop fever>100.4F, worsening pain, numbness, weakness or any other concerns.

## 2021-10-12 NOTE — ED PROVIDER NOTE - PATIENT PORTAL LINK FT
You can access the FollowMyHealth Patient Portal offered by [facility] by registering at the following website: [email or web address]. By joining [employer]’s FollowMyHealth portal, you will also be able to view your health information using other applications (apps) compatible with our system.

## 2021-10-12 NOTE — ED PROVIDER NOTE - OBJECTIVE STATEMENT
41yo male with pmhx of polysubstance abuse, s/p L hip surgery ~7mo ago presents with left hip pain. No new injury or trauma. He reports pain in the left lateral hip. Denies numbness or weakness. States he has an orthopedist for follow up.

## 2021-10-25 ENCOUNTER — EMERGENCY (EMERGENCY)
Facility: HOSPITAL | Age: 41
LOS: 1 days | Discharge: HOME | End: 2021-10-25
Attending: EMERGENCY MEDICINE | Admitting: EMERGENCY MEDICINE
Payer: MEDICAID

## 2021-10-25 VITALS
SYSTOLIC BLOOD PRESSURE: 118 MMHG | RESPIRATION RATE: 18 BRPM | DIASTOLIC BLOOD PRESSURE: 73 MMHG | OXYGEN SATURATION: 96 % | WEIGHT: 173.94 LBS | TEMPERATURE: 98 F | HEART RATE: 103 BPM | HEIGHT: 70 IN

## 2021-10-25 DIAGNOSIS — F17.200 NICOTINE DEPENDENCE, UNSPECIFIED, UNCOMPLICATED: ICD-10-CM

## 2021-10-25 DIAGNOSIS — Z88.0 ALLERGY STATUS TO PENICILLIN: ICD-10-CM

## 2021-10-25 DIAGNOSIS — Z91.018 ALLERGY TO OTHER FOODS: ICD-10-CM

## 2021-10-25 DIAGNOSIS — Z59.00 HOMELESSNESS UNSPECIFIED: ICD-10-CM

## 2021-10-25 DIAGNOSIS — F32.9 MAJOR DEPRESSIVE DISORDER, SINGLE EPISODE, UNSPECIFIED: ICD-10-CM

## 2021-10-25 DIAGNOSIS — Z20.822 CONTACT WITH AND (SUSPECTED) EXPOSURE TO COVID-19: ICD-10-CM

## 2021-10-25 DIAGNOSIS — F19.20 OTHER PSYCHOACTIVE SUBSTANCE DEPENDENCE, UNCOMPLICATED: ICD-10-CM

## 2021-10-25 DIAGNOSIS — R45.851 SUICIDAL IDEATIONS: ICD-10-CM

## 2021-10-25 DIAGNOSIS — F20.9 SCHIZOPHRENIA, UNSPECIFIED: ICD-10-CM

## 2021-10-25 DIAGNOSIS — Z59.02 UNSHELTERED HOMELESSNESS: ICD-10-CM

## 2021-10-25 DIAGNOSIS — M25.552 PAIN IN LEFT HIP: ICD-10-CM

## 2021-10-25 LAB
AMPHET UR-MCNC: NEGATIVE — SIGNIFICANT CHANGE UP
ANION GAP SERPL CALC-SCNC: 11 MMOL/L — SIGNIFICANT CHANGE UP (ref 5–17)
APAP SERPL-MCNC: <5 UG/ML — LOW (ref 10–30)
APPEARANCE UR: CLEAR — SIGNIFICANT CHANGE UP
BARBITURATES UR SCN-MCNC: NEGATIVE — SIGNIFICANT CHANGE UP
BASOPHILS # BLD AUTO: 0.04 K/UL — SIGNIFICANT CHANGE UP (ref 0–0.2)
BASOPHILS NFR BLD AUTO: 0.6 % — SIGNIFICANT CHANGE UP (ref 0–2)
BENZODIAZ UR-MCNC: NEGATIVE — SIGNIFICANT CHANGE UP
BILIRUB UR-MCNC: NEGATIVE — SIGNIFICANT CHANGE UP
BUN SERPL-MCNC: 7 MG/DL — SIGNIFICANT CHANGE UP (ref 7–23)
CALCIUM SERPL-MCNC: 9.2 MG/DL — SIGNIFICANT CHANGE UP (ref 8.4–10.5)
CHLORIDE SERPL-SCNC: 101 MMOL/L — SIGNIFICANT CHANGE UP (ref 96–108)
CO2 SERPL-SCNC: 26 MMOL/L — SIGNIFICANT CHANGE UP (ref 22–31)
COCAINE METAB.OTHER UR-MCNC: NEGATIVE — SIGNIFICANT CHANGE UP
COLOR SPEC: YELLOW — SIGNIFICANT CHANGE UP
CREAT SERPL-MCNC: 0.7 MG/DL — SIGNIFICANT CHANGE UP (ref 0.5–1.3)
DIFF PNL FLD: NEGATIVE — SIGNIFICANT CHANGE UP
EOSINOPHIL # BLD AUTO: 0.08 K/UL — SIGNIFICANT CHANGE UP (ref 0–0.5)
EOSINOPHIL NFR BLD AUTO: 1.2 % — SIGNIFICANT CHANGE UP (ref 0–6)
ETHANOL SERPL-MCNC: <10 MG/DL — SIGNIFICANT CHANGE UP (ref 0–10)
GLUCOSE SERPL-MCNC: 106 MG/DL — HIGH (ref 70–99)
GLUCOSE UR QL: NEGATIVE — SIGNIFICANT CHANGE UP
HCT VFR BLD CALC: 42.9 % — SIGNIFICANT CHANGE UP (ref 39–50)
HGB BLD-MCNC: 13.9 G/DL — SIGNIFICANT CHANGE UP (ref 13–17)
IMM GRANULOCYTES NFR BLD AUTO: 0.3 % — SIGNIFICANT CHANGE UP (ref 0–1.5)
KETONES UR-MCNC: NEGATIVE — SIGNIFICANT CHANGE UP
LEUKOCYTE ESTERASE UR-ACNC: NEGATIVE — SIGNIFICANT CHANGE UP
LYMPHOCYTES # BLD AUTO: 2.82 K/UL — SIGNIFICANT CHANGE UP (ref 1–3.3)
LYMPHOCYTES # BLD AUTO: 42.7 % — SIGNIFICANT CHANGE UP (ref 13–44)
MCHC RBC-ENTMCNC: 29.3 PG — SIGNIFICANT CHANGE UP (ref 27–34)
MCHC RBC-ENTMCNC: 32.4 GM/DL — SIGNIFICANT CHANGE UP (ref 32–36)
MCV RBC AUTO: 90.3 FL — SIGNIFICANT CHANGE UP (ref 80–100)
METHADONE UR-MCNC: NEGATIVE — SIGNIFICANT CHANGE UP
MONOCYTES # BLD AUTO: 0.42 K/UL — SIGNIFICANT CHANGE UP (ref 0–0.9)
MONOCYTES NFR BLD AUTO: 6.4 % — SIGNIFICANT CHANGE UP (ref 2–14)
NEUTROPHILS # BLD AUTO: 3.22 K/UL — SIGNIFICANT CHANGE UP (ref 1.8–7.4)
NEUTROPHILS NFR BLD AUTO: 48.8 % — SIGNIFICANT CHANGE UP (ref 43–77)
NITRITE UR-MCNC: NEGATIVE — SIGNIFICANT CHANGE UP
NRBC # BLD: 0 /100 WBCS — SIGNIFICANT CHANGE UP (ref 0–0)
OPIATES UR-MCNC: NEGATIVE — SIGNIFICANT CHANGE UP
PCP SPEC-MCNC: SIGNIFICANT CHANGE UP
PCP UR-MCNC: NEGATIVE — SIGNIFICANT CHANGE UP
PH UR: 6 — SIGNIFICANT CHANGE UP (ref 5–8)
PLATELET # BLD AUTO: 241 K/UL — SIGNIFICANT CHANGE UP (ref 150–400)
POTASSIUM SERPL-MCNC: 3.8 MMOL/L — SIGNIFICANT CHANGE UP (ref 3.5–5.3)
POTASSIUM SERPL-SCNC: 3.8 MMOL/L — SIGNIFICANT CHANGE UP (ref 3.5–5.3)
PROT UR-MCNC: NEGATIVE MG/DL — SIGNIFICANT CHANGE UP
RBC # BLD: 4.75 M/UL — SIGNIFICANT CHANGE UP (ref 4.2–5.8)
RBC # FLD: 14.4 % — SIGNIFICANT CHANGE UP (ref 10.3–14.5)
SALICYLATES SERPL-MCNC: <0.3 MG/DL — LOW (ref 2.8–20)
SODIUM SERPL-SCNC: 138 MMOL/L — SIGNIFICANT CHANGE UP (ref 135–145)
SP GR SPEC: 1.01 — SIGNIFICANT CHANGE UP (ref 1–1.03)
THC UR QL: NEGATIVE — SIGNIFICANT CHANGE UP
UROBILINOGEN FLD QL: 0.2 E.U./DL — SIGNIFICANT CHANGE UP
WBC # BLD: 6.6 K/UL — SIGNIFICANT CHANGE UP (ref 3.8–10.5)
WBC # FLD AUTO: 6.6 K/UL — SIGNIFICANT CHANGE UP (ref 3.8–10.5)

## 2021-10-25 PROCEDURE — U0003: CPT

## 2021-10-25 PROCEDURE — U0005: CPT

## 2021-10-25 PROCEDURE — 85025 COMPLETE CBC W/AUTO DIFF WBC: CPT

## 2021-10-25 PROCEDURE — 99285 EMERGENCY DEPT VISIT HI MDM: CPT | Mod: 25

## 2021-10-25 PROCEDURE — 81003 URINALYSIS AUTO W/O SCOPE: CPT

## 2021-10-25 PROCEDURE — 36415 COLL VENOUS BLD VENIPUNCTURE: CPT

## 2021-10-25 PROCEDURE — 99285 EMERGENCY DEPT VISIT HI MDM: CPT

## 2021-10-25 PROCEDURE — 80048 BASIC METABOLIC PNL TOTAL CA: CPT

## 2021-10-25 PROCEDURE — 80307 DRUG TEST PRSMV CHEM ANLYZR: CPT

## 2021-10-25 PROCEDURE — 93005 ELECTROCARDIOGRAM TRACING: CPT

## 2021-10-25 SDOH — ECONOMIC STABILITY - HOUSING INSECURITY: UNSHELTERED HOMELESSNESS: Z59.02

## 2021-10-25 SDOH — ECONOMIC STABILITY - HOUSING INSECURITY: HOMELESSNESS UNSPECIFIED: Z59.00

## 2021-10-25 NOTE — ED PROVIDER NOTE - MUSCULOSKELETAL, MLM
Spine appears normal, range of motion is not limited, + well healed scar to lateral left hip. + tenderness. FROM. no erythema. no bruising. no deformity. distal NVI. remainder of LLE non tender.

## 2021-10-25 NOTE — ED ADULT NURSE REASSESSMENT NOTE - NS ED NURSE REASSESS COMMENT FT1
Continuous close 1:1 observation of pt maintained, psychiatric evaluation complete, pt to hold over to AM for re-evaluation. NAD noted.

## 2021-10-25 NOTE — ED PROVIDER NOTE - OBJECTIVE STATEMENT
39 y/o male with hx of polysubstance abuse, lift hip surgery 7 months ago c/o left hip pain and SI. pt notes persistent left hip pain since surgery. pt denies trauma. no numbness, tingling or weakness. no fever or chills. also pt notes hx of bipolar and worsening depression. pt states " I want to jump of bridge". no HI. no AH or VH. pt denies recent drug or etoh use. no further complaints.

## 2021-10-25 NOTE — ED PROVIDER NOTE - PATIENT PORTAL LINK FT
You can access the FollowMyHealth Patient Portal offered by [facility] by registering at the following website: [email or web address]. By joining [employer]’s FollowMyHealth portal, you will also be able to view your health information using other applications (apps) compatible with our system.

## 2021-10-25 NOTE — ED PROVIDER NOTE - NSFOLLOWUPINSTRUCTIONS_ED_ALL_ED_FT
Depression    WHAT YOU NEED TO KNOW:    Depression is a medical condition that causes feelings of sadness or hopelessness that do not go away. Depression may cause you to lose interest in things you used to enjoy. These feelings may interfere with your daily life.    DISCHARGE INSTRUCTIONS:    Call your local emergency number (911 in the ) if:   •You think about harming yourself or someone else.      •You have done something on purpose to hurt yourself.      Call your therapist or doctor if:   •Your symptoms do not improve.      •You cannot make it to your next appointment.       •You have new symptoms.      •You have questions or concerns about your condition or care.      The following resources are available at any time to help you, if needed:   •National Suicide Prevention Lifeline: 1-969.426.9801 (3-934-916-TALK)      •Suicide Hotline: 1-631.767.8829 (4-735-NXKMWIA)      •For a list of international numbers: https://[employer].org/find-help/international-resources/      Medicines:   •Antidepressants may be given to improve or balance your mood. You may need to take this medicine for several weeks before you begin to feel better.      •Take your medicine as directed. Contact your healthcare provider if you think your medicine is not helping or if you have side effects. Tell him of her if you are allergic to any medicine. Keep a list of the medicines, vitamins, and herbs you take. Include the amounts, and when and why you take them. Bring the list or the pill bottles to follow-up visits. Carry your medicine list with you in case of an emergency.      Therapy is often used together with medicine to relieve depression. Therapy is a way for you to talk about your feelings and anything that may be causing depression. Therapy can be done alone or in a group. It may also be done with family members or a significant other.    Self-care:   •Get regular physical activity. Try to be active for 30 minutes, 3 to 5 days a week. Physical activity can help relieve depression. Work with your healthcare provider to develop a plan that you enjoy. It may help to ask someone to be active with you.      •Create a regular sleep schedule. A routine can help you relax before bed. Listen to music, read, or do yoga. Try to go to bed and wake up at the same time every day. Sleep is important for emotional health.      •Eat a variety of healthy foods. Healthy foods include fruits, vegetables, whole-grain breads, low-fat dairy products, lean meats, fish, and cooked beans. A healthy meal plan is low in fat, salt, and added sugar.      •Do not drink alcohol or use drugs. Alcohol and drugs can make depression worse. Talk to your therapist or doctor if you need help quitting.      Follow up with your healthcare provider as directed: Your healthcare provider will monitor your progress at follow-up visits. He or she will also monitor your medicine if you take antidepressants. Your healthcare provider will ask if the medicine is helping. Tell him or her about any side effects or problems you may have with your medicine. The type or amount of medicine may need to be changed. Write down your questions so you remember to ask them during your visits.       © Copyright [employer] 2021           back to top                          © Copyright [employer] 2021 Depression    WHAT YOU NEED TO KNOW:    Depression is a medical condition that causes feelings of sadness or hopelessness that do not go away. Depression may cause you to lose interest in things you used to enjoy. These feelings may interfere with your daily life.  DISCHARGE INSTRUCTIONS:  Call your local emergency number (911 in the ) if:   •You think about harming yourself or someone else.  •You have done something on purpose to hurt yourself.  Call your therapist or doctor if:   •Your symptoms do not improve.  •You cannot make it to your next appointment.  •You have new symptoms.  •You have questions or concerns about your condition or care  The following resources are available at any time to help you, if needed:   •National Suicide Prevention Lifeline: 1-271.813.7032 (1-800-273-TALK)  •Suicide Hotline: 1-471.472.9874 (1-757-EHRERFT  •For a list of international numbers: https://[employer].org/find-help/international-resources/  Medicines:   •Antidepressants may be given to improve or balance your mood. You may need to take this medicine for several weeks before you begin to feel better.  •Take your medicine as directed. Contact your healthcare provider if you think your medicine is not helping or if you have side effects. Tell him of her if you are allergic to any medicine. Keep a list of the medicines, vitamins, and herbs you take. Include the amounts, and when and why you take them. Bring the list or the pill bottles to follow-up visits. Carry your medicine list with you in case of an emergency.  Therapy is often used together with medicine to relieve depression. Therapy is a way for you to talk about your feelings and anything that may be causing depression. Therapy can be done alone or in a group. It may also be done with family members or a significant other.  Self-care:   •Get regular physical activity. Try to be active for 30 minutes, 3 to 5 days a week. Physical activity can help relieve depression. Work with your healthcare provider to develop a plan that you enjoy. It may help to ask someone to be active with you.  •Create a regular sleep schedule. A routine can help you relax before bed. Listen to music, read, or do yoga. Try to go to bed and wake up at the same time every day. Sleep is important for emotional health.  •Eat a variety of healthy foods. Healthy foods include fruits, vegetables, whole-grain breads, low-fat dairy products, lean meats, fish, and cooked beans. A healthy meal plan is low in fat, salt, and added sugar.  •Do not drink alcohol or use drugs. Alcohol and drugs can make depression worse. Talk to your therapist or doctor if you need help quitting  Follow up with your healthcare provider as directed: Your healthcare provider will monitor your progress at follow-up visits. He or she will also monitor your medicine if you take antidepressants. Your healthcare provider will ask if the medicine is helping. Tell him or her about any side effects or problems you may have with your medicine. The type or amount of medicine may need to be changed. Write down your questions so you remember to ask them during your visits.

## 2021-10-25 NOTE — ED PROVIDER NOTE - ATTENDING CONTRIBUTION TO CARE
I discussed the plan of care of the patient directly with the PA while the patient was in the Emergency Department. VSS, pt sitting in chair comfortably, gait steady, mild ttp over L hip, clear speech.  I have reviewed the ACP note and agree with the history, exam and plan of care.

## 2021-10-25 NOTE — ED BEHAVIORAL HEALTH ASSESSMENT NOTE - DESCRIPTION
Patient initially was angry that he was not assigned a stretcher. Requesting food to eat. Otherwise in behavioral control, not agitated. see hpi

## 2021-10-25 NOTE — ED BEHAVIORAL HEALTH ASSESSMENT NOTE - NSACTIVEVENT_PSY_ALL_CORE
Current or pending social isolation/Pending incarceration or homelessness/Inadequate social supports

## 2021-10-25 NOTE — ED PROVIDER NOTE - CLINICAL SUMMARY MEDICAL DECISION MAKING FREE TEXT BOX
depression and hip pain. pt well appearing. vss. do not suspect septic joint. neurovascular intact. pt refused x-ray. + SI stating wants to jump of bridge. labs noted. psych consulted. psych recommending revaluation in am.

## 2021-10-25 NOTE — ED ADULT NURSE REASSESSMENT NOTE - NS ED NURSE REASSESS COMMENT FT1
Pt, with hx of schizophrenia and past suicidal attempt "I jumped from a bridge", presents to ER tinoco chair 7 with SI "I want to jump off a bridge, I think I'm manic" and AH "like you're going to die". Pt denies any HI or VH at this time. Assessment as noted, orders received, labs and nasopharyngeal swab obtained, sent to lab, waiting results. EKG complete, presented to ER physician for interpretation. Pt placed on continuous close 1:1 observation, belongings secured, pt wanded, placed in appropriate attire.

## 2021-10-25 NOTE — ED BEHAVIORAL HEALTH ASSESSMENT NOTE - OTHER
not assessed undomiciled, unemployed said mood is 5/10 poverty of thought. unwilling to cooperate with full examination

## 2021-10-25 NOTE — ED BEHAVIORAL HEALTH ASSESSMENT NOTE - DETAILS
as above despite endorsing SI to jump off a bridge, patient unable to participate in meaningful conversation regarding SI, answering I don't know multiple times. Patient [facility] but didn't know the outcome of that. contacted PA staff hip pain

## 2021-10-25 NOTE — ED ADULT NURSE NOTE - OBJECTIVE STATEMENT
Pt, with hx of schizophrenia and past suicidal attempt "I jumped from a bridge", presents to ER with SI "I want to jump off a bridge, I think I'm manic" and AH "like you're going to die". Pt denies any HI or VH at this time.

## 2021-10-25 NOTE — ED BEHAVIORAL HEALTH ASSESSMENT NOTE - ADDITIONAL DETAILS ALL
despite endorsing SI to jump off a bridge, patient unable to participate in meaningful conversation regarding SI, answering I don't know multiple times. Patient [facility] but didn't know the outcome of that.

## 2021-10-25 NOTE — ED PROVIDER NOTE - PROGRESS NOTE DETAILS
Riccardo: medically cleared and signed out to me pending psych reassessment. Pt remains stable. Will reassess. Director - pt received from night team pending psych placement.  Pt sleeping comfortably.  Pt medically clear, VS, labs reviewed. vanessa: pt received at sign out from oksana lott/dr belle as pending psych dispo, at 9:10 am pt aaox3, demanding to leave, stating "I'm good now, I slept, I wanna go" -- psych called to re assess for dispo

## 2021-10-25 NOTE — ED ADULT NURSE NOTE - CHIEF COMPLAINT QUOTE
Pt c/o SI and HI states "I want to jump off a bridge." Pt reports hx of bipolar, states "I think I am manic." PT denies alcohol, drug use. Pt changed in triage, wanded by security, 1:1 initiated in triage.

## 2021-10-25 NOTE — ED BEHAVIORAL HEALTH ASSESSMENT NOTE - SUMMARY
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     Patient seen on the chair in ED wearing yellow gown, sleeping, calm, in no apparent distress. He says he wants to admit himself because he was not feeling good, says that he talks to himself. Despite multiple attempts to engage patient, patient appears uninterested to elaborate more on suicidal thought or plan, answering with "I don't know". Patient does not appear overtly manic, depressed, or internally preoccupied at this time. Says appetite is good and requested food in ED. Patient reports he used cocaine two days ago, 1 bag, used K2 today, and doesn't know whether he used alcohol. Patient denies withdrawal symptoms. Patient says he is undomiciled, gets money by panhandling, and has no outside supports. Patient currently feels safe in the hospital and does not have any immediate thoughts to harm self. He denies past or current ideas to hurt others.    Patient's current presentation is most consistent with polysubstance use disorder (cocaine/K2 predominantly), R/O untreated schizophrenia vs schizoaffective disorder vs bipolar disorder. Patient unwilling to participate in full evaluation and demanding to be admitted with vague suicidal ideation with no clear plan. Patient's presentation is concerning for potential secondary gain as well as rapid resolution following metabolizing substances. Nonetheless, currently patient is unable to contract for safety. Patient does not appear to have an increased acute risk to harm himself. Chronically he is at an increased risk to harm self because of his poor insight to illness, lack of outpatient follow up and chronic substance use    Recommendations:  - Hold for reevaluation in AM  - If patient becomes agitated, may offer olanzapine 5mg PO. If patient refuses PO, may offer 5mg IM.

## 2021-10-25 NOTE — ED BEHAVIORAL HEALTH ASSESSMENT NOTE - REMOTE MEMORY
[facility] HOSP - [facility]    Diabetes Education  Note    Myrtie Newer Patient Status:  Inpatient   1950 MRN [id number]  Location [facility]5W Attending Jodie Hoyt MD  Hosp Day # 9 PCP Ashley Bianchi MD        Reason for Visit Impaired

## 2021-10-25 NOTE — ED BEHAVIORAL HEALTH ASSESSMENT NOTE - DIFFERENTIAL
Polysubstance use disorder (cocaine/K2 predominantly), R/O untreated schizophrenia vs schizoaffective disorder vs bipolar disorder  Potential secondary gain

## 2021-10-25 NOTE — ED BEHAVIORAL HEALTH ASSESSMENT NOTE - RISK ASSESSMENT
Static risk factor: male sex, polysubstance use, past hospitalizations,  Modifiable risk factor: non-adherence to treatment, current intoxication state, undomiciled status, chronic substance use  Protective factor: when sober, patient is not actively endorsing SI/HI Low Acute Suicide Risk

## 2021-10-25 NOTE — ED BEHAVIORAL HEALTH ASSESSMENT NOTE - HPI (INCLUDE ILLNESS QUALITY, SEVERITY, DURATION, TIMING, CONTEXT, MODIFYING FACTORS, ASSOCIATED SIGNS AND SYMPTOMS)
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     Patient seen on the chair in ED wearing yellow gown, sleeping, calm, in no apparent distress. He says he wants to admit himself because he was not feeling good, says that he talks to himself. Patient responds I don't know when asked about the history of his mental illness, precipitating factors, his symptoms for self reported bipolar diagnosis. Says that two weeks ago he wants to jump off a bridge, says I don't know when asked what precipitated SI, how far he carried his plan, and what helped him stop himself. Patient [facility] but didn't know the outcome of that. Says that one reason why he wants to jump is because he is having hip pain since 8 weeks ago but says he has a doctor who helps him with pain. Patient denies aron, grandiosity, flight of ideas, lack of need for sleep. He reports past paranoia/ideas of reference, hearing voices, when asked whether he feels right now he says yes to paranoia but I don't know to hearing voices, and unable to elaborate further. Patient reports sleep is poor, concentration is poor, unable to elaborate further. Says appetite is good and requested food in ED. Patient reports he used cocaine two days ago, 1 bag, used K2 today, and doesn't know whether he used alcohol. Patient denies withdrawal symptoms.    Patient says he is undomiciled, gets money by panhandling, and has no outside supports. Patient currently feels safe in the hospital and does not have any immediate thoughts to harm self. He denies past or current ideas to hurt others.      Of note, patient was seen on 08/13 with similar presentation - had CAH and wanting to harm self by snorting heroin, then later recanted suicidality and was discharged. As per PSYCKES, patient has 9 encounters for SI, multiple different diagnoses: Schizoaffective Disorder | Cocaine related disorders | Alcohol related disorders | Schizophrenia | Other psychoactive substance related disorders | Cannabis related disorders | Bipolar I | Major Depressive Disorder | Substance-Induced Depressive Disorder | Adjustment Disorder |Opioid related disorders | Unspecified/Other Bipolar | Other Mental Disorders | Unspecified/Other Psychotic Disorders | Conduct Disorder |Delusional Disorder | Sedative, hypnotic, or anxiolytic related disorders | Substance-Induced Psychotic Disorder | Tobacco related disorder |Unspecified/Other Personality Disorder    Patient's most recent IP was in [place] for 9 days 04/27-05/06/21. He has >10 ED presentations for the past 5 months and 1 unintentional opioid overdose 08/01-05/21 at [facility]. He has had numerous medication trials including SWANSON haldol/Abilify in 2018. Most recently he was prescribed chlorpromazine 200mg bid and trazodone 150mg qhs.

## 2021-10-25 NOTE — ED BEHAVIORAL HEALTH ASSESSMENT NOTE - PSYCHIATRIC ISSUES AND PLAN (INCLUDE STANDING AND PRN MEDICATION)
olanzapine 5mg PO if patient is agitated and not amenable to redirection. May give IM if patient refuses

## 2021-10-25 NOTE — ED BEHAVIORAL HEALTH ASSESSMENT NOTE - NSBHSASTIM_PSY_A_CORE FT
Patient returned on cart from CT1 with anesthesia staff and [employer] and Ihsan in attendance.   see hpi

## 2021-10-25 NOTE — ED ADULT TRIAGE NOTE - CHIEF COMPLAINT QUOTE
Pt c/o SI and HI states "I want to jump off a bridge." Pt reports hx of bipolar, states "I think I am manic." PT denies alcohol, drug use. Pt changed in triage, waned by security, 1:1 initiated in triage. Pt c/o SI and HI states "I want to jump off a bridge." Pt reports hx of bipolar, states "I think I am manic." PT denies alcohol, drug use. Pt changed in triage, wanded by security, 1:1 initiated in triage.

## 2021-10-26 VITALS
RESPIRATION RATE: 18 BRPM | OXYGEN SATURATION: 98 % | DIASTOLIC BLOOD PRESSURE: 68 MMHG | SYSTOLIC BLOOD PRESSURE: 138 MMHG | HEART RATE: 80 BPM

## 2021-10-26 DIAGNOSIS — R45.851 SUICIDAL IDEATIONS: ICD-10-CM

## 2021-10-26 LAB — SARS-COV-2 RNA SPEC QL NAA+PROBE: SIGNIFICANT CHANGE UP

## 2021-10-26 PROCEDURE — 99283 EMERGENCY DEPT VISIT LOW MDM: CPT

## 2021-10-26 NOTE — ED ADULT NURSE REASSESSMENT NOTE - NS ED NURSE REASSESS COMMENT FT1
Continuous close 1:1 observation of pt maintained, NAD noted, pt sleeping, waiting psychiatric re-evaluation in AM

## 2021-10-26 NOTE — ED ADULT NURSE REASSESSMENT NOTE - NS ED NURSE REASSESS COMMENT FT1
Continuous close 1:1 observation of pt maintained, NAD noted, pt sleeping, waiting psychiatric re-evaluation,

## 2021-10-26 NOTE — BH CONSULTATION LIAISON PROGRESS NOTE - NSBHCONSULTRECOMMENDOTHER_PSY_A_CORE FT
Go TODAY or tomorrow at 8:30am:  •	[facility]  o	Walk in services, patients should arrive at 8:30am   o	[address] [place] [address] [phone]    Additional resources are:  •	[facility]  o	[email or web address]  o	Comprehensive addiction treatment services, including psychopharm, psychotherapy, drug testing, as well as eating disorder treatment, and specialized treatment for LGBTQ, [employer] populations  o	Two locations  ?	[address] [facility] [address] [phone]  ?	[address], [place] [id number] [phone]    •	[facility]  o	[email or web address]  o	[address] [place] [address]  (corner of [address] [place] [address] and [facility])  Telephone: [phone]    [address] [place] [address] crisis line and connection to treatments in your area

## 2021-10-26 NOTE — ED ADULT NURSE REASSESSMENT NOTE - NS ED NURSE REASSESS COMMENT FT1
Pt sleeping, NAD noted, continuous close 1:1 observation of pt maintained, pt waiting psychiatric re-evaluation in AM

## 2021-10-26 NOTE — BH CONSULTATION LIAISON PROGRESS NOTE - NSBHCHARTREVIEWVS_PSY_A_CORE FT
Vital Signs Last 24 Hrs  T(C): 36.7 (26 Oct 2021 07:47), Max: 36.8 (25 Oct 2021 19:33)  T(F): 98.1 (26 Oct 2021 07:47), Max: 98.3 (25 Oct 2021 19:33)  HR: 76 (26 Oct 2021 07:47) (76 - 103)  BP: 125/85 (26 Oct 2021 07:47) (118/73 - 134/99)  BP(mean): --  RR: 16 (26 Oct 2021 07:47) (16 - 18)  SpO2: 97% (26 Oct 2021 07:47) (96% - 98%)

## 2021-10-26 NOTE — BH CONSULTATION LIAISON PROGRESS NOTE - NSBHASSESSMENTFT_PSY_ALL_CORE
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 "    Pt had presented on his own, seeking food and to sleep, reporting SI and recent cocaine and K2 use.  Upon reassessment this morning, he is not clinically intoxicated, severely depressed/manic or with impaired reality testing such that discharge would be unsafe.  He shows good frustration tolerance, seeks to leave, has future-oriented plans, and has capacity for safe care in the community.  No grounds to hold involuntarily.  Pt shows help-seeking behaviors, is aware how to access treatment should he need it or feel unsafe.  He will go to [facility], will also go to clinic for ongoing treatment morteza substance use.  Return precautions.  He is at elevated chronic risk, morteza due to substance abuse and social stressors, but his acute risk is low and at baseline at this time, morteza as no longer with SI or influenced by substances.     -psychiatrically cleared for discharge  -followup at [facility] as below

## 2021-10-26 NOTE — BH CONSULTATION LIAISON PROGRESS NOTE - NSBHFUPINTERVALHXFT_PSY_A_CORE
d/w ER team that pt has stabilized, is asking to leave, denying SI, becoming somewhat upset that he is still in the hospital.    Pt assessed at bedside, had 1:1.  Pt was alert, attentive, in behavioral control, denying SI, reported that he felt that way yesterday and no longer has been having such thoughts after getting some sleep.  He seeks discharge.  He has future-oriented plan to go to [facility] today.  He has not taken psychiatric medication in a long time, agreeable to connect to outpt care.  No SI/HI/AVH.  He admitted using K2 yesterday and feels no effects anymore.  He is not internally preoccupied or responding to internal stimuli, has concrete but logical reasoning.

## 2021-12-01 ENCOUNTER — EMERGENCY (EMERGENCY)
Facility: HOSPITAL | Age: 41
LOS: 1 days | Discharge: HOME | End: 2021-12-01
Admitting: EMERGENCY MEDICINE
Payer: MEDICAID

## 2021-12-01 VITALS
RESPIRATION RATE: 18 BRPM | SYSTOLIC BLOOD PRESSURE: 108 MMHG | HEART RATE: 78 BPM | DIASTOLIC BLOOD PRESSURE: 70 MMHG | OXYGEN SATURATION: 97 % | WEIGHT: 179.9 LBS | TEMPERATURE: 98 F | HEIGHT: 70 IN

## 2021-12-01 DIAGNOSIS — Z87.898 PERSONAL HISTORY OF OTHER SPECIFIED CONDITIONS: ICD-10-CM

## 2021-12-01 DIAGNOSIS — Z59.00 HOMELESSNESS UNSPECIFIED: ICD-10-CM

## 2021-12-01 DIAGNOSIS — M25.552 PAIN IN LEFT HIP: ICD-10-CM

## 2021-12-01 DIAGNOSIS — Z91.018 ALLERGY TO OTHER FOODS: ICD-10-CM

## 2021-12-01 DIAGNOSIS — Z20.822 CONTACT WITH AND (SUSPECTED) EXPOSURE TO COVID-19: ICD-10-CM

## 2021-12-01 DIAGNOSIS — Z88.0 ALLERGY STATUS TO PENICILLIN: ICD-10-CM

## 2021-12-01 DIAGNOSIS — G89.29 OTHER CHRONIC PAIN: ICD-10-CM

## 2021-12-01 DIAGNOSIS — F20.9 SCHIZOPHRENIA, UNSPECIFIED: ICD-10-CM

## 2021-12-01 PROCEDURE — 99283 EMERGENCY DEPT VISIT LOW MDM: CPT

## 2021-12-01 RX ORDER — IBUPROFEN 200 MG
600 TABLET ORAL ONCE
Refills: 0 | Status: COMPLETED | OUTPATIENT
Start: 2021-12-01 | End: 2021-12-01

## 2021-12-01 RX ORDER — IBUPROFEN 200 MG
1 TABLET ORAL
Qty: 21 | Refills: 0
Start: 2021-12-01 | End: 2021-12-07

## 2021-12-01 SDOH — ECONOMIC STABILITY - HOUSING INSECURITY: HOMELESSNESS UNSPECIFIED: Z59.00

## 2021-12-01 NOTE — ED ADULT TRIAGE NOTE - CHIEF COMPLAINT QUOTE
pt here for LT hip pain x a few months after a fall, requesting a cane or walker, ambulatory with steady gait, no obvious deformities

## 2021-12-01 NOTE — ED ADULT NURSE NOTE - NSICDXPASTMEDICALHX_GEN_ALL_CORE_FT
PAST MEDICAL HISTORY:  Drug use     Polysubstance (excluding opioids) dependence     Psychiatric disorder     Schizophrenia, unspecified type

## 2021-12-01 NOTE — ED PROVIDER NOTE - EYES, MLM
[de-identified] : 30 year old female follow up for PND\par LCV 12/23/2020 at which time prescribed Ipratropium\par Previously has tried INCS, azelastine, reflux meds including PPI, famotidine, PO abx and steroids\par Reports some improvement nasal congestion w ipratropium but no relief with PND\par Since discontinuing ipra has noticed worsening of symptoms\par Denies facial pain/pressure, anterior rhinorrhea, recent infections
Clear bilaterally, pupils equal, round

## 2021-12-01 NOTE — ED PROVIDER NOTE - CPE EDP ENMT NORM
Left message with patient's wife. Patient's wife states patient is sleeping will call us back. Also, see the other 4/16 TE orders call. This encounter is closed. normal...

## 2021-12-01 NOTE — ED PROVIDER NOTE - CLINICAL SUMMARY MEDICAL DECISION MAKING FREE TEXT BOX
39 yo male in the Er c/o chronic left hip pain. Pt mentioned that few month ago he had surgery for left hip fx. pt denies new injury, denies swelling or discoloration to his left LE, denies lower back pain, denies fever, chills. Pt appears to be homeless and states that he is constantly on  his feet. on exam- ambulatory, in NAD, has no signs of infection and the surgical incision scar appears healing well. Pt also requested sandwich and some time to rest. anticipate d/c home for further out pt f/u with his orthopedist.

## 2021-12-01 NOTE — ED PROVIDER NOTE - PATIENT PORTAL LINK FT
You can access the FollowMyHealth Patient Portal offered by [facility] by registering at the following website: [email or web address]. By joining [employer]’s FollowMyHealth portal, you will also be able to view your health information using other applications (apps) compatible with our system.

## 2021-12-01 NOTE — ED ADULT NURSE NOTE - CAS ELECT INFOMATION PROVIDED
PT left without papers after he received tuna fish sandwhich with ginger ale, pt smiled then left ED./DC instructions

## 2021-12-01 NOTE — ED PROVIDER NOTE - OBJECTIVE STATEMENT
39 yo male in the Er c/o chronic left hip pain. Pt mentioned that few month ago he had surgery for left hip fx. pt denies new injury, denies swelling or discoloration to his left LE, denies lower back pain, denies fever, chills. Pt appears to be homeless and states that he is constantly on  his feet. Pt also requested sandwich and some time to rest.

## 2021-12-01 NOTE — ED PROVIDER NOTE - NSFOLLOWUPINSTRUCTIONS_ED_ALL_ED_FT
Hip Rehabilitation in the Home      After hip surgery, it is important to follow instructions from your health care provider about rehabilitation (rehab). It is important to design a program that is safe and effective for you. Your health care provider and rehab therapist will work with you to meet your specific abilities and needs.      What are the benefits?  Hip rehab can help to:  •Strengthen your hip.      •Improve the flexibility and movement (range of motion) of your hip joint.      •Reduce swelling.      •Improve blood flow and prevent blood clots.        How to do exercises at home     •Continue exercises at home that your health care provider or rehab therapist instructed you to do in the hospital.    •Before you exercise:  •Take pain medicines, if told by your health care provider. Do not take the medicine if it makes you feel dizzy or sleepy.      •Do a warm-up activity, such as gentle walking, as told by your health care provider. This warms up your muscles and helps prevent injury.      •While doing exercises:  •When standing, make sure you are near something sturdy that you can hold onto for balance, such as a heavy chair or a wall.      •Do exercises exactly as told by your health care provider and adjust them as directed.      •As you are recovering, choose an exercise pace that is comfortable for you, and gradually work up to your goal.    •Follow activity restrictions as told by your health care provider. This may vary depending on the type of hip surgery you had. For example, your health care provider may instruct you to:  •Not bring your knees higher than your hips.      •Not cross your legs.      •Not twist while lying down or standing.      •Avoid rotating the toes of your affected leg inward. Keep your toes pointing straight ahead.          • Do not exercise in a pool (aquatic therapy) until your incision from surgery is healed and your health care provider says that you can.        Follow these instructions at home:    Activity     • Do not use your affected leg to support your body weight until your health care provider says that you can. Use crutches or a walker as told by your health care provider.      •Ask your health care provider what activities are safe for you during recovery, and ask what activities you need to avoid.      •Avoid sitting for a long time without moving. Get up to take short walks every 1–2 hours. Ask for help if you feel weak or unsteady.        Managing pain, stiffness, and swelling    •Put ice on affected areas after you exercise, or as needed. Icing can help to relieve joint pain and swelling.  •Put ice in a plastic bag that you were given.      •Place a towel between your skin and the bag.      •Leave the ice on for 20 minutes, 2–3 times a day.      •If directed, apply heat to affected areas before you exercise, or as needed. Heat can reduce muscle and joint stiffness. Use the heat source that your health care provider recommends, such as a moist heat pack or a heating pad.  •Place a towel between your skin and the heat source.      •Leave the heat on for 20–30 minutes.      •Remove the heat if your skin turns bright red. This is especially important if you are unable to feel pain, heat, or cold. You may have a greater risk of getting burned.        •Wear compression stockings as told by your health care provider. These stockings help to prevent blood clots and reduce swelling in your legs.      •Raise (elevate) your legs while sitting or lying down.      Preventing falls     •Keep your home well-lit and clutter-free, especially in walkways and stairways. Keep floors dry and use non-skid mats.      •Remove tripping hazards from floors, such as throw rugs and cords.      •Install grab bars in bathrooms, and put night-lights in your bedroom and bathroom.      •Wear closed-toe shoes that fit well and support your feet. Wear shoes that have rubber soles or low heels.      •Talk with your health care provider about the over-the-counter and prescription medicines that you are taking. Some medicines may increase your risk of falling.        General recommendations    •Teach your family about your condition and how they can participate in your recovery. Try bringing your family members with you to a physical therapy session.      •Take over-the-counter and prescription medicines only as told by your health care provider.      •Keep all follow-up visits as told by your health care provider and rehab therapist. This is important.        Questions to ask your health care provider    •What exercises are safe for me to do?      •How often should I do the exercises?      •How can I manage pain during exercise?      •What other activities are safe for me to do?        Contact a health care provider if:    •You have questions about how to do exercises correctly.      •You have hip or groin pain that does not go away after resting and taking pain medicine.      •Your artificial hip (prosthesis) feels loose.      •You are not able to do exercises.        Get help right away if:    •You fall.      •Your incision from surgery breaks open.      •Your affected leg is shorter than the other leg, and this is a new problem.        Summary    •Hip rehab can help to strengthen your hip and improve the flexibility and movement of your hip joint.      •Continue exercises at home that your health care provider or physical therapist instructed you to do in the hospital.      •Contact your health care provider if you have questions about how to do exercises correctly.      This information is not intended to replace advice given to you by your health care provider. Make sure you discuss any questions you have with your health care provider.      Please take Ibuprofen for pain as needed and follow up with your orthopedic surgeon for re-evaluation in a few days.

## 2021-12-01 NOTE — ED PROVIDER NOTE - MUSCULOSKELETAL, MLM
Spine appears normal, range of motion is not limited, no deformity, NROM to left hip joint, no signs of infection at the surgical incision scar, b/l LE - no edema, good distal; pulses, no discolorations

## 2021-12-02 PROBLEM — F19.20 OTHER PSYCHOACTIVE SUBSTANCE DEPENDENCE, UNCOMPLICATED: Chronic | Status: ACTIVE | Noted: 2021-04-25

## 2021-12-04 ENCOUNTER — EMERGENCY (EMERGENCY)
Facility: HOSPITAL | Age: 41
LOS: 1 days | Discharge: LEFT BEFORE DISCHARGE | End: 2021-12-04
Admitting: EMERGENCY MEDICINE
Payer: MEDICAID

## 2021-12-04 VITALS
RESPIRATION RATE: 17 BRPM | DIASTOLIC BLOOD PRESSURE: 88 MMHG | HEIGHT: 70 IN | TEMPERATURE: 98 F | HEART RATE: 78 BPM | SYSTOLIC BLOOD PRESSURE: 123 MMHG | OXYGEN SATURATION: 98 %

## 2021-12-04 DIAGNOSIS — Z53.21 PROCEDURE AND TREATMENT NOT CARRIED OUT DUE TO PATIENT LEAVING PRIOR TO BEING SEEN BY HEALTH CARE PROVIDER: ICD-10-CM

## 2021-12-04 PROCEDURE — L9991: CPT

## 2021-12-04 NOTE — ED ADULT TRIAGE NOTE - ARRIVAL INFO ADDITIONAL COMMENTS
Un-domiciled male brought in by EMS [place] Concerned citizen called EMS. Patient noted to be awake and responsive. Able to ambulate independently with steady gait. Denies head trauma, blood thinner use, and LOC.

## 2021-12-13 NOTE — ED BEHAVIORAL HEALTH ASSESSMENT NOTE - LANGUAGE
What Type Of Note Output Would You Prefer (Optional)?: Bullet Format How Severe Is Your Skin Lesion?: moderate Has Your Skin Lesion Been Treated?: not been treated Is This A New Presentation, Or A Follow-Up?: Skin Lesions No abnormalities noted

## 2022-01-21 ENCOUNTER — EMERGENCY (EMERGENCY)
Facility: HOSPITAL | Age: 42
LOS: 1 days | Discharge: HOME | End: 2022-01-21
Admitting: EMERGENCY MEDICINE
Payer: MEDICAID

## 2022-01-21 VITALS
OXYGEN SATURATION: 96 % | HEIGHT: 70 IN | HEART RATE: 102 BPM | RESPIRATION RATE: 18 BRPM | SYSTOLIC BLOOD PRESSURE: 137 MMHG | WEIGHT: 149.91 LBS | TEMPERATURE: 98 F | DIASTOLIC BLOOD PRESSURE: 85 MMHG

## 2022-01-21 DIAGNOSIS — M79.605 PAIN IN LEFT LEG: ICD-10-CM

## 2022-01-21 DIAGNOSIS — M25.552 PAIN IN LEFT HIP: ICD-10-CM

## 2022-01-21 DIAGNOSIS — Z91.018 ALLERGY TO OTHER FOODS: ICD-10-CM

## 2022-01-21 DIAGNOSIS — Z88.0 ALLERGY STATUS TO PENICILLIN: ICD-10-CM

## 2022-01-21 DIAGNOSIS — G89.29 OTHER CHRONIC PAIN: ICD-10-CM

## 2022-01-21 PROCEDURE — 99283 EMERGENCY DEPT VISIT LOW MDM: CPT

## 2022-01-21 RX ORDER — IBUPROFEN 200 MG
600 TABLET ORAL ONCE
Refills: 0 | Status: COMPLETED | OUTPATIENT
Start: 2022-01-21 | End: 2022-01-21

## 2022-01-21 RX ADMIN — Medication 600 MILLIGRAM(S): at 12:54

## 2022-01-21 NOTE — ED PROVIDER NOTE - NSFOLLOWUPINSTRUCTIONS_ED_ALL_ED_FT
Take tylenol 650mg or motrin 400-800mg as needed every 4-6 hours for pain.   REST- Rest your hurting/injured joint or extremity to decrease pain and swelling for 24-48 hours    ICE- Apply ice to area of pain to decreased inflammation and pain, put towel/barrier between ice and skin. You can keep ice on for 20 minutes at a time 4-8 times daily   COMPRESSION- Wear ace wrap or brace for support to reduce swelling.  Make sure not to wrap too tight, loosen if skin feeling numb/tingling or skin turns blue   ELEVATION- Elevate hurting/injured area 6 or more inches about level of heart to decrease swelling/inflammation.  Use pillow under joint to elevate area    Follow up with orthopedic specialist as scheduled

## 2022-01-21 NOTE — ED ADULT NURSE NOTE - NSIMPLEMENTINTERV_GEN_ALL_ED
Implemented All Universal Safety Interventions:  [place] to call system. Call bell, personal items and telephone within reach. Instruct patient to call for assistance. Room bathroom lighting operational. Non-slip footwear when patient is off stretcher. Physically safe environment: no spills, clutter or unnecessary equipment. Stretcher in lowest position, wheels locked, appropriate side rails in place.

## 2022-01-21 NOTE — ED PROVIDER NOTE - PHYSICAL EXAMINATION
Vitals reviewed  Gen: disheveled appearing, nad, speaking in full sentences  Skin: wwp, no rash/lesions  HEENT: ncat, eomi, mmm  CV: rrr, no audible m/r/g  Resp: symmetrical expansion, ctab, no w/r/r  Ext: FROM throughout, no joint ttp, no peripheral edema, distal pulses/SILT  Neuro: alert/oriented, no focal deficits, steady gait w/ cane

## 2022-01-21 NOTE — ED ADULT NURSE NOTE - OBJECTIVE STATEMENT
pt c/o L leg pain, complaining to see doctor for oxycodone, loud and rude to staff. fell asleep on chair. patent airway. no acute distress

## 2022-01-21 NOTE — ED PROVIDER NOTE - OBJECTIVE STATEMENT
40 M h/o PSA, psh L hip fracture, p/w chronic left hip pain.  taking tylenol without relief.  requesting oxycontin.  also requesting food.  pt denies new injury, swelling or discoloration to leg, numbness/weakness, lower back pain, fever, chills.

## 2022-01-21 NOTE — ED PROVIDER NOTE - CLINICAL SUMMARY MEDICAL DECISION MAKING FREE TEXT BOX
40 M h/o PSA, psh L hip fracture, p/w chronic left hip pain, no new trauma, no numbness/weakness/f/c.  requesting opioids and food.  will offer motrin and can f/u outpt.  discussed strict return parameters

## 2022-01-21 NOTE — ED PROVIDER NOTE - PATIENT PORTAL LINK FT
You can access the FollowMyHealth Patient Portal offered by [facility] by registering at the following website: [email or web address]. By joining [employer]’s FollowMyHealth portal, you will also be able to view your health information using other applications (apps) compatible with our system.

## 2022-03-31 ENCOUNTER — EMERGENCY (EMERGENCY)
Facility: HOSPITAL | Age: 42
LOS: 1 days | Discharge: HOME | End: 2022-03-31
Attending: EMERGENCY MEDICINE | Admitting: EMERGENCY MEDICINE
Payer: SELF-PAY

## 2022-03-31 VITALS
TEMPERATURE: 98 F | HEART RATE: 97 BPM | HEIGHT: 70 IN | RESPIRATION RATE: 18 BRPM | SYSTOLIC BLOOD PRESSURE: 130 MMHG | OXYGEN SATURATION: 96 % | DIASTOLIC BLOOD PRESSURE: 76 MMHG

## 2022-03-31 DIAGNOSIS — F17.200 NICOTINE DEPENDENCE, UNSPECIFIED, UNCOMPLICATED: ICD-10-CM

## 2022-03-31 DIAGNOSIS — F19.94 OTHER PSYCHOACTIVE SUBSTANCE USE, UNSPECIFIED WITH PSYCHOACTIVE SUBSTANCE-INDUCED MOOD DISORDER: ICD-10-CM

## 2022-03-31 DIAGNOSIS — Z88.0 ALLERGY STATUS TO PENICILLIN: ICD-10-CM

## 2022-03-31 DIAGNOSIS — F14.10 COCAINE ABUSE, UNCOMPLICATED: ICD-10-CM

## 2022-03-31 DIAGNOSIS — F20.9 SCHIZOPHRENIA, UNSPECIFIED: ICD-10-CM

## 2022-03-31 DIAGNOSIS — Z20.822 CONTACT WITH AND (SUSPECTED) EXPOSURE TO COVID-19: ICD-10-CM

## 2022-03-31 DIAGNOSIS — Z91.018 ALLERGY TO OTHER FOODS: ICD-10-CM

## 2022-03-31 DIAGNOSIS — F11.20 OPIOID DEPENDENCE, UNCOMPLICATED: ICD-10-CM

## 2022-03-31 DIAGNOSIS — Z56.0 UNEMPLOYMENT, UNSPECIFIED: ICD-10-CM

## 2022-03-31 DIAGNOSIS — Z59.00 HOMELESSNESS UNSPECIFIED: ICD-10-CM

## 2022-03-31 DIAGNOSIS — F32.A DEPRESSION, UNSPECIFIED: ICD-10-CM

## 2022-03-31 PROCEDURE — 80053 COMPREHEN METABOLIC PANEL: CPT

## 2022-03-31 PROCEDURE — 85025 COMPLETE CBC W/AUTO DIFF WBC: CPT

## 2022-03-31 PROCEDURE — 99285 EMERGENCY DEPT VISIT HI MDM: CPT

## 2022-03-31 PROCEDURE — 80307 DRUG TEST PRSMV CHEM ANLYZR: CPT

## 2022-03-31 PROCEDURE — 99283 EMERGENCY DEPT VISIT LOW MDM: CPT

## 2022-03-31 PROCEDURE — 36415 COLL VENOUS BLD VENIPUNCTURE: CPT

## 2022-03-31 PROCEDURE — 87635 SARS-COV-2 COVID-19 AMP PRB: CPT

## 2022-03-31 PROCEDURE — 93005 ELECTROCARDIOGRAM TRACING: CPT

## 2022-03-31 PROCEDURE — 93010 ELECTROCARDIOGRAM REPORT: CPT

## 2022-03-31 PROCEDURE — 99053 MED SERV 10PM-8AM 24 HR FAC: CPT

## 2022-03-31 SDOH — ECONOMIC STABILITY - HOUSING INSECURITY: HOMELESSNESS UNSPECIFIED: Z59.00

## 2022-03-31 SDOH — ECONOMIC STABILITY - INCOME SECURITY: UNEMPLOYMENT, UNSPECIFIED: Z56.0

## 2022-03-31 NOTE — ED ADULT NURSE NOTE - OBJECTIVE STATEMENT
41 y M, states he would like to do a psych evaluation. denies any medical complaints denies chest pain, sob, nausea, vomiting, fever denies H.I/ S.I

## 2022-04-01 VITALS
HEART RATE: 92 BPM | TEMPERATURE: 98 F | RESPIRATION RATE: 18 BRPM | OXYGEN SATURATION: 98 % | SYSTOLIC BLOOD PRESSURE: 122 MMHG | DIASTOLIC BLOOD PRESSURE: 70 MMHG

## 2022-04-01 DIAGNOSIS — F14.10 COCAINE ABUSE, UNCOMPLICATED: ICD-10-CM

## 2022-04-01 DIAGNOSIS — Z98.890 OTHER SPECIFIED POSTPROCEDURAL STATES: Chronic | ICD-10-CM

## 2022-04-01 DIAGNOSIS — F19.94 OTHER PSYCHOACTIVE SUBSTANCE USE, UNSPECIFIED WITH PSYCHOACTIVE SUBSTANCE-INDUCED MOOD DISORDER: ICD-10-CM

## 2022-04-01 DIAGNOSIS — F11.20 OPIOID DEPENDENCE, UNCOMPLICATED: ICD-10-CM

## 2022-04-01 DIAGNOSIS — F19.10 OTHER PSYCHOACTIVE SUBSTANCE ABUSE, UNCOMPLICATED: ICD-10-CM

## 2022-04-01 LAB
ALBUMIN SERPL ELPH-MCNC: 4.1 G/DL — SIGNIFICANT CHANGE UP (ref 3.3–5)
ALP SERPL-CCNC: 106 U/L — SIGNIFICANT CHANGE UP (ref 40–120)
ALT FLD-CCNC: 17 U/L — SIGNIFICANT CHANGE UP (ref 10–45)
ANION GAP SERPL CALC-SCNC: 10 MMOL/L — SIGNIFICANT CHANGE UP (ref 5–17)
APAP SERPL-MCNC: <5 UG/ML — LOW (ref 10–30)
AST SERPL-CCNC: 31 U/L — SIGNIFICANT CHANGE UP (ref 10–40)
BASOPHILS # BLD AUTO: 0.05 K/UL — SIGNIFICANT CHANGE UP (ref 0–0.2)
BASOPHILS NFR BLD AUTO: 0.8 % — SIGNIFICANT CHANGE UP (ref 0–2)
BILIRUB SERPL-MCNC: 0.4 MG/DL — SIGNIFICANT CHANGE UP (ref 0.2–1.2)
BUN SERPL-MCNC: 12 MG/DL — SIGNIFICANT CHANGE UP (ref 7–23)
CALCIUM SERPL-MCNC: 9 MG/DL — SIGNIFICANT CHANGE UP (ref 8.4–10.5)
CHLORIDE SERPL-SCNC: 98 MMOL/L — SIGNIFICANT CHANGE UP (ref 96–108)
CO2 SERPL-SCNC: 29 MMOL/L — SIGNIFICANT CHANGE UP (ref 22–31)
CREAT SERPL-MCNC: 0.66 MG/DL — SIGNIFICANT CHANGE UP (ref 0.5–1.3)
EGFR: 122 ML/MIN/1.73M2 — SIGNIFICANT CHANGE UP
EOSINOPHIL # BLD AUTO: 0.07 K/UL — SIGNIFICANT CHANGE UP (ref 0–0.5)
EOSINOPHIL NFR BLD AUTO: 1.1 % — SIGNIFICANT CHANGE UP (ref 0–6)
ETHANOL SERPL-MCNC: <10 MG/DL — SIGNIFICANT CHANGE UP (ref 0–10)
GLUCOSE SERPL-MCNC: 108 MG/DL — HIGH (ref 70–99)
HCT VFR BLD CALC: 43.9 % — SIGNIFICANT CHANGE UP (ref 39–50)
HGB BLD-MCNC: 15.5 G/DL — SIGNIFICANT CHANGE UP (ref 13–17)
IMM GRANULOCYTES NFR BLD AUTO: 0.2 % — SIGNIFICANT CHANGE UP (ref 0–1.5)
LYMPHOCYTES # BLD AUTO: 2.73 K/UL — SIGNIFICANT CHANGE UP (ref 1–3.3)
LYMPHOCYTES # BLD AUTO: 42.1 % — SIGNIFICANT CHANGE UP (ref 13–44)
MCHC RBC-ENTMCNC: 30.9 PG — SIGNIFICANT CHANGE UP (ref 27–34)
MCHC RBC-ENTMCNC: 35.3 GM/DL — SIGNIFICANT CHANGE UP (ref 32–36)
MCV RBC AUTO: 87.6 FL — SIGNIFICANT CHANGE UP (ref 80–100)
MONOCYTES # BLD AUTO: 0.52 K/UL — SIGNIFICANT CHANGE UP (ref 0–0.9)
MONOCYTES NFR BLD AUTO: 8 % — SIGNIFICANT CHANGE UP (ref 2–14)
NEUTROPHILS # BLD AUTO: 3.11 K/UL — SIGNIFICANT CHANGE UP (ref 1.8–7.4)
NEUTROPHILS NFR BLD AUTO: 47.8 % — SIGNIFICANT CHANGE UP (ref 43–77)
NRBC # BLD: 0 /100 WBCS — SIGNIFICANT CHANGE UP (ref 0–0)
PLATELET # BLD AUTO: 219 K/UL — SIGNIFICANT CHANGE UP (ref 150–400)
POTASSIUM SERPL-MCNC: 3.7 MMOL/L — SIGNIFICANT CHANGE UP (ref 3.5–5.3)
POTASSIUM SERPL-SCNC: 3.7 MMOL/L — SIGNIFICANT CHANGE UP (ref 3.5–5.3)
PROT SERPL-MCNC: 6.5 G/DL — SIGNIFICANT CHANGE UP (ref 6–8.3)
RBC # BLD: 5.01 M/UL — SIGNIFICANT CHANGE UP (ref 4.2–5.8)
RBC # FLD: 12.7 % — SIGNIFICANT CHANGE UP (ref 10.3–14.5)
SALICYLATES SERPL-MCNC: <0.3 MG/DL — LOW (ref 2.8–20)
SARS-COV-2 RNA SPEC QL NAA+PROBE: NEGATIVE — SIGNIFICANT CHANGE UP
SODIUM SERPL-SCNC: 137 MMOL/L — SIGNIFICANT CHANGE UP (ref 135–145)
WBC # BLD: 6.49 K/UL — SIGNIFICANT CHANGE UP (ref 3.8–10.5)
WBC # FLD AUTO: 6.49 K/UL — SIGNIFICANT CHANGE UP (ref 3.8–10.5)

## 2022-04-01 PROCEDURE — 90792 PSYCH DIAG EVAL W/MED SRVCS: CPT | Mod: 95

## 2022-04-01 NOTE — BH CONSULTATION LIAISON PROGRESS NOTE - NSBHCONSULTFOLLOWAFTERCARE_PSY_A_CORE FT
Appreciate SW input if pt interested in inpatient rehab.  Please provide referral information for:  •	[facility]  o	[email or web address]  o	Comprehensive addiction treatment services, including psychopharm, psychotherapy, drug testing, as well as eating disorder treatment, and specialized treatment for LGBTQ, Nondenominational Yazidism populations  o	Walk Ins Mon-Fri 9am-2pm.   o	Two locations  ?	[address] [place] [address], [address] [facility] [id number] [phone]  ?	[address] [place] [address] [phone]  •	[facility] [place]  o	[email or web address]  o	Comprehensive addiction treatment services, including psychopharm, psychotherapy, opioid treatment program  o	[address] [place] [address] [phone]   Appreciate SW input if pt interested in inpatient rehab -- currently declining.  Please provide referral information for:  •	[facility]  o	[email or web address]  o	Comprehensive addiction treatment services, including psychopharm, psychotherapy, drug testing, as well as eating disorder treatment, and specialized treatment for LGBTQ, Rastafarian Jain populations  o	Walk Ins Mon-Fri 9am-2pm.   o	Two locations  ?	[address] [place] [address], 7th Floor [facility] [id number] [phone]  ?	[address] [place] [address] [phone]  •	[facility] [place]  o	[email or web address]  o	Comprehensive addiction treatment services, including psychopharm, psychotherapy, opioid treatment program  o	[address] [place] [address] [phone]

## 2022-04-01 NOTE — ED BEHAVIORAL HEALTH ASSESSMENT NOTE - OTHER PAST PSYCHIATRIC HISTORY (INCLUDE DETAILS REGARDING ONSET, COURSE OF ILLNESS, INPATIENT/OUTPATIENT TREATMENT)
As per PSKES, patient has 9 encounters for SI, multiple different diagnoses: Schizoaffective Disorder | Cocaine related disorders | Alcohol related disorders | Schizophrenia | Other psychoactive substance related disorders | Cannabis related disorders | Bipolar I | Major Depressive Disorder | Substance-Induced Depressive Disorder | Adjustment Disorder |Opioid related disorders | Unspecified/Other Bipolar | Other Mental Disorders | Unspecified/Other Psychotic Disorders | Conduct Disorder |Delusional Disorder | Sedative, hypnotic, or anxiolytic related disorders | Substance-Induced Psychotic Disorder | Tobacco related disorder |Unspecified/Other Personality Disorder

## 2022-04-01 NOTE — BH CONSULTATION LIAISON PROGRESS NOTE - NSBHASSESSMENTFT_PSY_ALL_CORE
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 After period of rest in the ED, pt denies any further SI or psychotic symptoms such as AH or overt paranoia, has remained in behavioral control, and is motivated for engagement in outpt substance/dual dx treatment. No objective withdrawal sx or decompensated psychosis on exam, though impoverished and concrete thoughts consistent with a possible underlying psychotic disorder. Suspected acute presentation 2/2 to substance induced mood/psychotic sx, without any persisting acute sx that would warrant inpt psychiatric care. While pt remains at moderately elevated chronic risk for suicide given his h/o substance use and other psychiatric diagnoses, with recurrent passive SI, his acute risk is now assessed to be low, without any persisting SI, notable future orientation, familiarity with mental health and emergency resources. No grounds for involuntary inpt care; outpt resources d/w pt.    RECOMMENDATIONS  -pt is psychiatrically cleared for discharge  -substance counseling provided  -referral options for substance rehab and outpt dual dx treatment discussed with pt. 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 After period of rest and metabolization of substances in the ED, pt denies any further SI or psychotic symptoms such as AH or overt paranoia, has remained in behavioral control, and is motivated for engagement in outpt substance/dual dx treatment. No objective withdrawal sx or decompensated psychosis on exam, though impoverished and concrete thoughts consistent with a possible underlying psychotic disorder. Suspected acute presentation 2/2 to substance induced mood/psychotic sx, without any persisting acute sx that would warrant inpt psychiatric care. While pt remains at moderately elevated chronic risk for suicide given his h/o substance use and other psychiatric diagnoses, with recurrent passive SI, his acute risk is now assessed to be low, without any persisting SI, notable future orientation, familiarity with mental health and emergency resources. No grounds for involuntary inpt care; outpt resources d/w pt.    DDx substance induced mood/psychotic d/o, opiate use d/o, cocaine use d/o, synthetic cannabis abuse, alcohol abuse by history, r/o schizophrenia or bipolar spectrum d/o, r/o aspect of malingering for shelter in ED    RECOMMENDATIONS  -pt is psychiatrically cleared for discharge  -no acute indication for psychotropic medications for mood/psychosis pending outpt assessment  -substance counseling provided  -referral options for substance rehab and outpt dual dx treatment discussed with pt. Please provide referral options (below)  -emergency options including 911, return to ED, [facility] including [facility] discussed  -d/w ED RN and PA

## 2022-04-01 NOTE — ED PROVIDER NOTE - NSFOLLOWUPINSTRUCTIONS_ED_ALL_ED_FT
Polysubstance Use Disorder    WHAT YOU NEED TO KNOW:    Polysubstance use disorder (PUD) is a medical condition that develops from long-term use or misuse of 2 or more substances. You are not able to stop even though it causes physical or social problems. PUD includes use of a drug such as cocaine or misuse of alcohol, tobacco, or a prescription medicine such as opioids. This disorder is also called polysubstance abuse.    DISCHARGE INSTRUCTIONS:    Call your local emergency number (911 in the [place]) or have someone call if:   •You have chest pain and your heart is beating faster than usual.    •You have a seizure.    •You feel you might harm yourself or others.    •You have new shortness of breath.    Return to the emergency department if:   •You are dizzy and lightheaded.    Call your doctor or therapist if:   •You know or think you are pregnant.    •You have questions or concerns about your condition or care.    Medicines:   •Withdrawal medicines may be given according to the substances. Withdrawal can cause serious, life-threatening side effects. Certain medicines can help decrease your withdrawal symptoms and your desire for the substance. Ask for more information about the withdrawal medicines you may need.    •Mood stabilizers may be given to help prevent or treat depression or anxiety caused by substance use and withdrawal.    •Take your medicine as directed. Contact your healthcare provider if you think your medicine is not helping or if you have side effects. Tell him or her if you are allergic to any medicine. Keep a list of the medicines, vitamins, and herbs you take. Include the amounts, and when and why you take them. Bring the list or the pill bottles to follow-up visits. Carry your medicine list with you in case of an emergency.    Therapy may be offered in a hospital, outpatient facility, or drug rehabilitation center. Healthcare providers can help you make decisions about treatment programs. The goal is to help you decrease or stop taking the substances.  •Cognitive behavioral therapy (CBT) can help you manage depression and anxiety caused by PUD. CBT can be done with you and a talk therapist or in a group with others.    •Motivational enhancement therapy can help you set and reach healthy, positive goals.    •Twelve-step facilitation (TSF) is a short, structured approach to reach early recovery. It is done one-to-one with a therapist in 12 to 15 sessions.    Safety guidelines:   •Do not combine medicines, drugs, or alcohol. The combination can cause an overdose, or cause you to stop breathing.    •Learn about the signs of an overdose so you know how to respond. Signs depend on the substances. Your heartbeat or breathing may be faster or slower than usual. You may have heavy sweating, vomiting, trouble sleeping, or sleeping too much. Your skin may be pale or clammy. You may have slurred or slow speech. Tell others about signs of an overdose so they will know what to do if needed. Talk to your healthcare provider about naloxone. You may be able to keep naloxone at home in case of an overdose. Your family and friends can also be trained on how to give it to you if needed. Get immediate help or call your local emergency number (911 in the ) for signs of an overdose.    •Take prescribed medicines exactly as directed. Do not take more than the recommended amount. Do not take it more often than recommended. If you use a pain patch, be sure to remove the old patch before you place a new one. Make sure the patch is not exposed to sunlight. Sunlight speeds up the opioid release from the patch.    •Keep substances out of the reach of children. Store substances in a locked cabinet or in a location that children cannot get to.    Follow up with your doctor or therapist as directed: Write down your questions so you remember to ask them during your visits.    For support and more information:   •Alcoholics Anonymous  Web Address: http://www.aa.org    •Substance Abuse and Mental Health Services Administration  [address]  [place] [address]  Web Address: http://www.sama.gov

## 2022-04-01 NOTE — ED PROVIDER NOTE - CLINICAL SUMMARY MEDICAL DECISION MAKING FREE TEXT BOX
polysubstance abuse, depression, AH, requesting to speak with psychiatry  -check labs  -ekg  -psych consult

## 2022-04-01 NOTE — ED PROVIDER NOTE - OBJECTIVE STATEMENT
41M hx polysubstance abuse, schizophrenia, undomiciled, c/o feeling depressed. pt states he has been injecting heroin and smoking K2. states he wants to stop using drugs. also states he's been hearing voices and feeling depressed.  states in the past he was on klonipin but not currently.

## 2022-04-01 NOTE — BH CONSULTATION LIAISON PROGRESS NOTE - OTHER
future oriented, focused on desire to stop using substances. Simple and concrete. No hopelessness, no SI or HI. No voiced delusions or paranoia occasionally falling asleep at beginning of interview with impaired concentration, later better able to maintain arousal stable posture lying in bed in ED hallway stretcher impaired based on recurrent substance use but seeking help in ED denies current perceptual disturbances. not overtly responding to internal stimuli fair, oddly related coherent, grossly linear track marks on forearms  not diaphoretic

## 2022-04-01 NOTE — ED BEHAVIORAL HEALTH ASSESSMENT NOTE - VIOLENCE RISK FACTORS:
Feeling of being under threat and being unable to control threat/Antisocial behavior/cognition (past or present)/Substance abuse/Affective dysregulation/Impulsivity/Noncompliance with treatment/Community stressors that increase the risk of destabilization

## 2022-04-01 NOTE — BH CONSULTATION LIAISON PROGRESS NOTE - NSBHFUPINTERVALHXFT_PSY_A_CORE
Per initial consult, "39 y/o male, undomiciled, panhandler, single, no family support, w/ PMH of L hip surgery, hx of polysubstance abuse (cocaine, K2, heroin, alcohol) vs bipolar/schizophrenia, high utilizer, last hospitalization in April 2021 at [place] for 9 days as per PSYCKES, multiple ED presentation in the context of substance use with rapid resolution, coming in today reporting he used heroin, cocaine, other substances, and does not feel safe, and would like to stop using drugs and is interested in detox/rehabilitation.    Patient is known to have complex picture of underlying psychiatric illness as well as polysubstance dependence, high utilizer, and is acutely intoxicated, thus making clear diagnosis and disposition is difficult due to recent substance use, and thus should be held in the ED for metabolization of substances and provide patient with detox/rehab in the morning if patient still interested, though he is known to retract SI and feel safe for discharge usually after substances have metabolized. But given patient history of SMI, known to previously be on SWANSON, with inpatient admission in the past, if patient continues to exhibit s/sx of mood, psychosis, would consider admission at that time, though again patient is known to reconstitute quickly."   Pt held overnight and reassessed this morning. No reported agitation, no PRN medications utilized.    On interview, pt found asleep, calm on approach, immediately reporting desire to stop using drugs -- reports daily IV heroin, IV cocaine, and K2 use. Does not quantify use, supports use by libia States that he has been to several EDs in recent months but has not followed up with recommended outpt/rehab care. States that he likes to receive "Klonopin 2mg and clonidine 2", which he is occasionally given by EDs he visits to help him feel "calm". He reports auditory hallucinations "sometimes", denies current. He reports demoralization and sadness about his drug use but denies pervasive depression or anhedonia, denies elevated anxiety, denies any current suicidal thoughts or thoughts about death, denies any suicidal intent/plan/past attempts. Denies recent stressors, denies problems at his shelter in [place]. Reports estrangement from his father and other family residing in the [place] and declines to have family contacted.   Reports full body aches, denies other s/s of opiate withdrawal.

## 2022-04-01 NOTE — ED BEHAVIORAL HEALTH ASSESSMENT NOTE - NSACTIVEVENT_PSY_ALL_CORE
Current or pending social isolation/Substance intoxication or withdrawal/Pending incarceration or homelessness/Inadequate social supports/Perceived burden on family or others

## 2022-04-01 NOTE — ED BEHAVIORAL HEALTH ASSESSMENT NOTE - DESCRIPTION
ED Triage     ED Provider Assessment     Placed on 1:1     Routine labs for medical and psychiatric clearance     Psychiatry Consultation          Have you had a COVID-19 test in the last 90 days? y    Have you tested positive for COVID-19 antibodies? n    Have you received 2 doses of the COVID-19 vaccine? n    In the past 10 days, have you been around anyone with a positive COVID-19 test? n    Have you been out of [place] within the past 10 days? n homeless, no dependents, unemployed, not enrolled in school None known

## 2022-04-01 NOTE — ED PROVIDER NOTE - PATIENT PORTAL LINK FT
You can access the FollowMyHealth Patient Portal offered by [facility] by registering at the following website: [email or web address]. By joining [employer]’s FollowMyHealth portal, you will also be able to view your health information using other applications (apps) compatible with our system.

## 2022-04-01 NOTE — ED BEHAVIORAL HEALTH ASSESSMENT NOTE - DIFFERENTIAL
Substance Induced Psychosis  Substance Induced Mood Disorder  Heroin Use Disorder  Cocain Use Disorder  Cannabis/K2 Use Disorder

## 2022-04-01 NOTE — ED BEHAVIORAL HEALTH NOTE - BEHAVIORAL HEALTH NOTE
===================      PRE-HOSPITAL COURSE      ===================      SOURCE:  Secondhand EMR documentation.       DETAILS:  Patient presents self to ED requesting psych eval.       ============      ED COURSE:      ============      SOURCE:  RN and secondhand EMR documentation.       ARRIVAL:  Patient was cooperative with hospital protocol and allowed for gowning/wanding without incident. Patient presents with poor hygiene/unkempt. Patient placed on  1:1 In private room for consult.       BELONGINGS:  None notable.      BEHAVIOR: Blood/urine provided for routine labs without noted incident. Patient denies SI/HI/AH/VH however endorses wanting rehab to cease substance abuse. Patient is AOx4 and does make eye contact; patient sleeping while in ED/     TREATMENT: Patient did not require medication intervention while in ED; has been in behavioral control.      VISITORS:  Patient presently unaccompanied by social supports while in ED.            COVID Exposure Screen- collateral (i.e. third-party, chart review, belongings, etc; include EMS and ED staff)     1. *Has the patient been tested for COVID-19 in the last 90 days? ( ) Yes ( ) No (X ) Unknown- Reason: _____      IF YES PROCEED TO QUESTION #2. IF NO OR UNKNOWN, PLEASE SKIP TO QUESTION #3.      2. Date of test(s), type of test(s), result(s) for ALL tests in last 90 days: ________     3. *Has the patient received a COVID-19 vaccine? ( ) Yes ( ) No (X) Unknown- Reason: _____      IF YES PROCEED TO QUESTION #4. IF NO or UNKNOWN, PLEASE SKIP TO QUESTION #7.      4. Moderna ( ) Pfizer ( ) J&J ( )       5. Number of doses: ________      6. Date of last dose: ________      7. *In the past 10 days, has the patient been around anyone with a positive COVID-19 test?* ( ) Yes ( ) No (X) Unknown- Reason: ____      IF YES PLEASE ANSWER THE FOLLOWING QUESTIONS:      8. Was the patient within 6 feet of them for at least 15 minutes? ( ) Yes ( ) No ( ) Unknown- Reason: _____      9. Did the patient provide care for them? ( ) Yes ( ) No ( ) Unknown- Reason: ______      10. Did the patient have direct physical contact with them (touched, hugged, or kissed them)? ( ) Yes ( ) No ( ) Unknown- Reason: __      11. Did the patient share eating or drinking utensils with them? ( ) Yes ( ) No ( ) Unknown- Reason: ____      12. Did they sneeze, cough, or somehow get respiratory droplets on the patient? ( ) Yes ( ) No ( ) Unknown- Reason: ______

## 2022-04-01 NOTE — ED BEHAVIORAL HEALTH ASSESSMENT NOTE - PERCEPTIONS
SUBJECTIVE  Curtis Wilson is a 83 year old male presenting with very restless sleep. He does have underlying sleep apnea but is not on any treatment. He just doesn't sleep well at all at night. He doesn't know exactly what it is but he sleeps poorly at night. This leads to being tired and causes him to nap in the afternoons. He complains of hypersomnia and just a general lack of energy. He had a previous sleep study in 2015 that showed sleep apnea with a respiratory disturbance index of 21 per hour. Apnea hypopnea index of 10 per hour. As far as he knows he doesn't snore. He was tried on AutoPap after his sleep apnea was diagnosed but he couldn't show compliance and it was taken away. He like to get back on treatment for his sleep apnea so that he could sleep better and feel better.    I have reviewed the patients medications, allergies, and pertinent medical history.  I have updated those as appropriate.     PHYSICAL EXAMINATION  Vital Signs:   Visit Vitals   • /82   • Pulse 60   • Resp 20   • Ht 5' 5\" (1.651 m)   • Wt 82.5 kg   • BMI 30.25 kg/m2      Neck:  No JVD.  Carotid pulses are normal and symmetric.  No palpable cervical adenopathy or mass.  No thyromegaly or supraclavicular adenopathy palpable.   Thorax and Lungs: Normal to percussion.  Lung fields clear to auscultation.  Breath sounds equal and symmetric.  Chest expands symmetrically with respirations.  Expiratory phase normal.  No wheezing, rhonchi or crackles.  Trachea midline.   Cardiac: Regular rate and rhythm.  Normal S1, S2 without murmur or gallop.  S2 not increased.  PMI nondisplaced.   Extremities: No clubbing, cyanosis or edema.   Neurological: Appears alert and oriented x 3.  Mood and affect normal.  Gait and station normal.  No focal, motor or sensory deficits found.     ASSESSMENT  1. Sleep apnea, unspecified type    2. NOE (obstructive sleep apnea)          Restless sleep due to sleep apnea.    PLAN  Split night polysomnogram  recommended. Hopefully issues with CPAP can be remedied while he is in the sleep lab.           No abnormalities

## 2022-04-01 NOTE — ED BEHAVIORAL HEALTH ASSESSMENT NOTE - RISK ASSESSMENT
Patient will be held in the ED for re-assessment given patient is reporting suicidal ideation in the context of poly-substance use, and is high risk while still intoxicated due to impulsivity, disorganized thought process, but usually feels safe and retracts SI once substance have metabolized. Thus currently his risk is elevated, and should be re-assessed in the AM when cleared of drugs. Moderate Acute Suicide Risk

## 2022-04-01 NOTE — ED ADULT NURSE REASSESSMENT NOTE - NS ED NURSE REASSESS COMMENT FT1
Psych came and spoke with pt, no current needs. pt to be given rehab & housing resources upon DC. Pt calm at this time.

## 2022-04-01 NOTE — BH CONSULTATION LIAISON PROGRESS NOTE - NSBHCONSULTRECOMMENDOTHER_PSY_A_CORE FT
encourage engagement in substance treatment. Discussed options including rehab and outpt dual dx treatment.

## 2022-04-01 NOTE — BH CONSULTATION LIAISON PROGRESS NOTE - NSBHCHARTREVIEWVS_PSY_A_CORE FT
Vital Signs Last 24 Hrs  T(C): 36.8 (01 Apr 2022 07:48), Max: 36.9 (31 Mar 2022 22:10)  T(F): 98.2 (01 Apr 2022 07:48), Max: 98.4 (31 Mar 2022 22:10)  HR: 92 (01 Apr 2022 07:48) (92 - 97)  BP: 122/70 (01 Apr 2022 07:48) (122/70 - 130/76)  BP(mean): --  RR: 18 (01 Apr 2022 07:48) (18 - 18)  SpO2: 98% (01 Apr 2022 07:48) (96% - 98%)

## 2022-04-01 NOTE — ED BEHAVIORAL HEALTH ASSESSMENT NOTE - CURRENT PASSIVE IDEATION:
Lab Results   Component Value Date    HGBA1C 7 9 (H) 09/06/2019       Recent Labs     09/10/19  1616 09/10/19  2055 09/11/19  0732 09/11/19  1055   POCGLU 160* 147* 155* 140       Blood Sugar Average: Last 72 hrs:  (P) 546 [id number]     Patient reports that she has been off insulin since March after her bariatric surgery  Does not take any insulin at home  At home, her blood sugar ranges between  per her  A1c of 7 9 noted  Continue with insulin sliding scale and Accu-Cheks  Patient does not want to take insulin at home  Plan  to discharge on metformin b i d  Follows up with endocrinologist at [facility]    Prescription left on the chart Yes

## 2022-04-01 NOTE — ED BEHAVIORAL HEALTH ASSESSMENT NOTE - SUMMARY
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    Patient is known to have complex picture of underlying psychiatric illness as well as polysubstance dependence, high utilizer, and is acutely intoxicated, thus making clear diagnosis and disposition is difficult due to recent substance use, and thus should be held in the ED for metabolization of substances and provide patient with detox/rehab in the morning if patient still interested, though he is known to retract SI and feel safe for discharge usually after substances have metabolized. But given patient history of SMI, known to previously be on SWANSON, with inpatient admission in the past, if patient continues to exhibit s/sx of mood, psychosis, would consider admission at that time, though again patient is known to reconstitute quickly.

## 2022-04-01 NOTE — ED BEHAVIORAL HEALTH ASSESSMENT NOTE - NS ED BHA BILLING ATTENDING WO NP TRAINEE
Colleen Carr is a 70year old female. HPI:   Ladi is here for evalution of a rash on her neck for 1 week. Worried has shingles - outbreak in [place]. No fever or pain. Complains of chronic sinusitis. Has thick yellow mucoid d/c.   On no allergy meds [address] [facility])    • Congestive heart failure, unspecified 11/29/2011   • COPD (chronic obstructive pulmonary disease) ([facility] [address]    • Coronary atherosclerosis of unspecified type of vessel, native or graft 11/29/2011   • Edema 01/12/2012   • Esophageal reflux 01/12/2012 better.

## 2022-04-20 NOTE — ED ADULT NURSE NOTE - NS ED BHA OTHER STREET DRUGS MEDICATION
Start AZATHIOPRINE  150 mg daily     Reduce Mycophenolate from 540 mg 2 times daily to 360 mg  2 times daily for 3 days and then stop .    Start prednisone 10 mg daily for 5 days then stop     Wait for 3 months  from the day you stop mycophenolate , to start planning for pregnancy     For this 3 months , you have to continue birth control measures . After that talk to Maternal and fetal medicine team when it is safe for you to conceive after stopping your  Birth control medications .    Continue to hold  Cinacalcet. will check with pharmacy team if it would be safe in pregnancy should we decide to start it     We will do labs once a month during this immunosuppression regimen change     Please talk to Maternal ans fetal medicine provider regarding which  additional medications you need to stop for your pregnancy , apart from the immunosuppression medications which we made plan today already     
None known

## 2022-04-28 NOTE — BH CONSULTATION LIAISON PROGRESS NOTE - NSBHMSEPERCEPT_PSY_A_CORE
[FreeTextEntry1] : Patient was advised to maintain her cardiac medications.\par Obtained a copy of her prior cardiac testing from her Cardiologist, Dr. Landaverde\par Patient was advised to maintain a low fat, low cholesterol diet.\par 2D echo doppler will be rescheduled\par Fasting lipid panel CBC BMP and hepatic panel.\par Continue with risk factor modification.\par RV in 3 weeks.\par  No abnormalities

## 2022-06-15 NOTE — ED BEHAVIORAL HEALTH ASSESSMENT NOTE - PSYCHIATRIC ISSUES AND PLAN (INCLUDE STANDING AND PRN MEDICATION)
5/31/22 LEEP Bx: LUKE 1, ECC: at least LSIL. Plan 6 month cotest (due 11/30/22)   re-assessment once substances have metabolized

## 2022-07-27 NOTE — ED ADULT NURSE NOTE - OBJECTIVE STATEMENT
Opt out 40y M, un domiciled, hx of polysubstance abuse presents to ed for left hip pain, hx of left hip surgery x9 months ago. Pt is ambulatory with a steady gait, no distress noted.

## 2022-11-30 NOTE — ED PROVIDER NOTE - NEURO NEGATIVE STATEMENT, MLM
no loss of consciousness, no gait abnormality, no headache, no sensory deficits, and no weakness. Purse String (Intermediate) Text: Given the location of the defect and the characteristics of the surrounding skin a purse string intermediate closure was deemed most appropriate.  Undermining was performed circumferentially around the surgical defect.  A purse string suture was then placed and tightened.

## 2022-12-13 NOTE — ED ADULT NURSE NOTE - PAIN RATING/NUMBER SCALE (0-10): REST
5
I personally evaluated the patient. I reviewed the Resident’s or Physician Assistant’s note (as assigned above), and agree with the findings and plan except as documented in my note.

## 2023-01-05 NOTE — ED PROVIDER NOTE - WR ORDER DATE AND TIME 1
11-Oct-2021 21:45 Klisyri Pregnancy And Lactation Text: It is unknown if this medication can harm a developing fetus or if it is excreted in breast milk.

## 2023-03-06 NOTE — ED PROVIDER NOTE - CPE EDP GASTRO NORM
[FreeTextEntry1] : 65 year old man with HTN ESRD on treatment for  hep C, now with dyspnea. With significant stenosis of LCX and Ramus / significant AS. SP CABG/AVR on 10/28/21. Here for followup.\par #HTN- On Labetalol 400 mg TID; Nifedipine 90 mg BID\par #CAD- SP CABG/AVR now on ASA and Atorvastatin, continue present med\par #FU in 3 months [EKG obtained to assist in diagnosis and management of assessed problem(s)] : EKG obtained to assist in diagnosis and management of assessed problem(s) normal...

## 2023-04-13 NOTE — ED ADULT NURSE NOTE - CAS TRG GENERAL NORM CIRC DET
Strong peripheral pulses/Capillary refill less/equal to 2 seconds Qbrexza Counseling:  I discussed with the patient the risks of Qbrexza including but not limited to headache, mydriasis, blurred vision, dry eyes, nasal dryness, dry mouth, dry throat, dry skin, urinary hesitation, and constipation.  Local skin reactions including erythema, burning, stinging, and itching can also occur.

## 2023-04-19 NOTE — ED ADULT TRIAGE NOTE - CHIEF COMPLAINT QUOTE
pt presents altered. admits to etoh and k2.
09-Jan-2020 05:00
Scc Moderately Differentiated Histology Text: Full thickness keratinocyte atypia is observed microscopically. Focal dyskeratosis and apoptosis of keratinocytes is observed. The epidermis is acanthotic with growth of atypical keratinocytes beyond the level of the sebaceous glands, many groups of keratinocytes display abnormal or absent keratin formation.

## 2024-01-18 NOTE — ED PROVIDER NOTE - CPE EDP ENMT NORM
[normal...

## 2024-02-09 NOTE — ED ADULT NURSE NOTE - NS_BH TRG QUESTION6B_ED_ALL_ED
Sputum culture with stenotrophomonas  S/p 5-day course of Bactrim/minocycline  Follow-up repeat sputum culture ordered by pulmonology   No

## 2024-04-25 NOTE — ED ADULT TRIAGE NOTE - PATIENT ON (OXYGEN DELIVERY METHOD)
Date:2024   Patient Name: Kenny Cooper  : 2007   MRN: [id number]   Time IN: 10:00    Time OUT: 10:58     Referring Provider: Jessica Garcia APRN    PROGRESS NOTE    History of Present Illness:   Kenny Cooper is a 16 y.o. male who is being seen today for follow up individual Psychotherapy session.     Chief Complaint: Patient arrived at the [facility].  Patient expressed some struggles with sleep, feeling tired more than usual, isolation, avoidance, feeling alone, and anger.    ICD-10-CM ICD-9-CM   1. Severe episode of recurrent major depressive disorder, without psychotic features  F33.2 296.33        Clinical Maneuvering/Intervention: EMDR phase 2-6 to assist with depression.  Patient practiced a coping skill during phase 2.  Patient reprocessed the memory through phases 3 through 4.  In phases 5 and 6 was installing the positive cognition to challenge the negative cognition.    Target memory: 1a from target selection  SUDs start:7  Suds End:1  VOC start:4  VOC end:7    Assisted patient in processing above session content; acknowledged and normalized patient’s thoughts, feelings, and concerns to build appropriate rapport and a positive therapeutic relationship with open and honest communication.  Rationalized patient thought process regarding depression.  Discussed triggers associated with patient's depression.  Also discussed coping skills for patient to implement such as Mindful breathing.    Allowed patient to freely discuss issues without interruption or judgment. Provided safe, confidential environment to facilitate the development of positive therapeutic relationship and encourage open, honest communication. Assisted patient in identifying risk factors which would indicate the need for higher level of care including thoughts to harm self or others and/or self-harming behavior and encouraged patient to contact this office, call 911, or present to the nearest emergency room  should any of these events occur. Discussed crisis intervention services and means to access. Patient adamantly and convincingly denies current suicidal or homicidal ideation or perceptual disturbance.    Assessment Scores:   PHQ-9 Total Score:     SHASHANK-7 Score:    PTSD Total Score: .PTSDTOTALSCORE    (Scales based on 0 - 10 with 10 being the worst)  Depression: 7 Anxiety: 5       Mental Status Exam:   Hygiene:   good  Cooperation:  Cooperative  Eye Contact:  Good  Psychomotor Behavior:  Appropriate  Affect:  Appropriate  Mood: sad  Speech:  Normal  Thought Process:  Linear  Thought Content:  Mood congruent  Suicidal:  None  Homicidal:  None  Hallucinations:  None  Delusion:  None  Memory:  Intact  Orientation:  Person, Place, Time, and Situation  Reliability:  good  Insight:  Good  Judgement:  Good  Impulse Control:  Fair  Physical/Medical Issues:  No      Patient's Support Network Includes:   Guardian    Functional Status: Moderate impairment     Progress toward goal: At goal    Prognosis: Good with Ongoing Treatment     Medications:     Current Outpatient Medications:     cetirizine (zyrTEC) 10 MG tablet, Take 10 mg by mouth Daily., Disp: , Rfl:     D3-1000 25 MCG (1000 UT) capsule, Take 1 capsule by mouth Daily., Disp: , Rfl:     Desvenlafaxine Succinate ER 25 MG tablet sustained-release 24 hour, Take 1 tablet by mouth Daily., Disp: , Rfl:     ibuprofen (ADVIL,MOTRIN) 400 MG tablet, Take 1 tablet by mouth Every 8 (Eight) Hours As Needed for Mild Pain ., Disp: 18 tablet, Rfl: 0    loratadine (CLARITIN) 10 MG tablet, Take 10 mg by mouth Daily., Disp: , Rfl:     Loratadine 10 MG capsule, Take  by mouth., Disp: , Rfl:     melatonin 5 MG tablet tablet, Take 2 tablets by mouth every night at bedtime., Disp: , Rfl:     Visit Diagnosis/Orders Placed This Visit:    ICD-10-CM ICD-9-CM   1. Severe episode of recurrent major depressive disorder, without psychotic features  F33.2 296.33        PLAN:  Safety: No acute safety  concerns  Risk Assessment: Risk of self-harm acutely is low. Risk of self-harm chronically is also low, but could be further elevated in the event of treatment noncompliance and/or AODA.    Crisis Plan:  Symptoms and/or behaviors to indicate a crisis: Excessive worry or fear, Feeling sad or low, and Prolonged irritability or anger    What calming techniques or other strategies will patient use to de-escalate and stay safe: slow down, breathe, visualize calming self, think it though, listen to music, change focus, take a walk    Who is one person patient can contact to assist with de-escalation? Guardian and friends    Treatment Plan/Goals: Patient will continue supportive psychotherapy efforts and medication regimen as prescribed. Therapist will provide Cognitive Behavioral Therapy to assist patient in improving functioning and gaining coping skills, maintaining stability, and avoiding decompensation and the need for higher level of care. Plan for treatment was discussed during today's visit. Patient acknowledged and verbally consented to continue with current treatment plan and was educated on the importance of compliance with treatment and follow-up appointments.     Patient will contact this office, call 911 or present to the nearest emergency room should suicidal or homicidal ideations occur.     Follow Up:   No follow-ups on file.      MATTHEW Fermin   [facility] [place]     This document has been electronically signed by MATTHEW Fermin   April 25, 2024 09:59 EDT   room air

## 2024-10-03 NOTE — ED ADULT NURSE NOTE - NSFALLRSKUNASSIST_ED_ALL_ED
-- DO NOT REPLY / DO NOT REPLY ALL --  -- This inbox is not monitored. If this was sent to the wrong provider or department, reroute message to  [facility] Reroute pool. --  -- Message is from [facility]agement Center Operations ([facility]) --      Message Type:  Refill Medication   Refill request for Pended medication named: ezetimibe (ZETIA) 10 MG tablet   Preferred pharmacy verified, and selected.   [id number] [address] AT [facility]    Is the patient OUT of Medication?  Yes and Medication Refills handled by Practice Site        Message: Patient stated she was denied medication for requesting too soon, but is out of medication                    no

## 2025-05-22 NOTE — ED PROVIDER NOTE - GASTROINTESTINAL NEGATIVE STATEMENT, MLM
-PST for scheduled LHC (5/28/25) cancelled today secondary to LHC completed 4/3/25 and revealed nonobstructive CAD and no need for repeat LHC; may have been scheduled as an oversight  -Patient was cleared from a cardiovascular standpoint for pending transplant surgery based on recent LHC results as per Dr. Lucian Barahona's office note 5/8/25  -Discussed with Dr. Valerio no abdominal pain, no bloating, no constipation, no diarrhea, no nausea and no vomiting.